# Patient Record
Sex: FEMALE | Race: WHITE | NOT HISPANIC OR LATINO
[De-identification: names, ages, dates, MRNs, and addresses within clinical notes are randomized per-mention and may not be internally consistent; named-entity substitution may affect disease eponyms.]

---

## 2017-01-26 ENCOUNTER — TRANSCRIPTION ENCOUNTER (OUTPATIENT)
Age: 32
End: 2017-01-26

## 2017-01-26 ENCOUNTER — INPATIENT (INPATIENT)
Facility: HOSPITAL | Age: 32
LOS: 2 days | Discharge: HOME | End: 2017-01-29
Payer: MEDICAID

## 2017-01-26 VITALS — WEIGHT: 149.91 LBS | HEIGHT: 59 IN

## 2017-01-26 DIAGNOSIS — O47.1 FALSE LABOR AT OR AFTER 37 COMPLETED WEEKS OF GESTATION: ICD-10-CM

## 2017-01-26 DIAGNOSIS — Z34.80 ENCOUNTER FOR SUPERVISION OF OTHER NORMAL PREGNANCY, UNSPECIFIED TRIMESTER: ICD-10-CM

## 2017-01-26 LAB
BASOPHILS # BLD AUTO: 0 K/UL — SIGNIFICANT CHANGE UP (ref 0–0.2)
BASOPHILS NFR BLD AUTO: 0.5 % — SIGNIFICANT CHANGE UP (ref 0–2)
BLD GP AB SCN SERPL QL: NEGATIVE — SIGNIFICANT CHANGE UP
EOSINOPHIL # BLD AUTO: 0 K/UL — SIGNIFICANT CHANGE UP (ref 0–0.5)
EOSINOPHIL NFR BLD AUTO: 0.4 % — SIGNIFICANT CHANGE UP (ref 0–6)
HCT VFR BLD CALC: 32.1 % — LOW (ref 34.5–45)
HGB BLD-MCNC: 10.7 G/DL — LOW (ref 11.5–15.5)
LYMPHOCYTES # BLD AUTO: 1.4 K/UL — SIGNIFICANT CHANGE UP (ref 1–3.3)
LYMPHOCYTES # BLD AUTO: 17.6 % — SIGNIFICANT CHANGE UP (ref 13–44)
MCHC RBC-ENTMCNC: 27.4 PG — SIGNIFICANT CHANGE UP (ref 27–34)
MCHC RBC-ENTMCNC: 33.5 GM/DL — SIGNIFICANT CHANGE UP (ref 32–36)
MCV RBC AUTO: 81.7 FL — SIGNIFICANT CHANGE UP (ref 80–100)
MONOCYTES # BLD AUTO: 0.4 K/UL — SIGNIFICANT CHANGE UP (ref 0–0.9)
MONOCYTES NFR BLD AUTO: 5.6 % — SIGNIFICANT CHANGE UP (ref 2–14)
NEUTROPHILS # BLD AUTO: 5.8 K/UL — SIGNIFICANT CHANGE UP (ref 1.8–7.4)
NEUTROPHILS NFR BLD AUTO: 76 % — SIGNIFICANT CHANGE UP (ref 43–77)
PLATELET # BLD AUTO: 132 K/UL — LOW (ref 150–400)
RBC # BLD: 3.92 M/UL — SIGNIFICANT CHANGE UP (ref 3.8–5.2)
RBC # FLD: 15 % — HIGH (ref 10.3–14.5)
RH IG SCN BLD-IMP: POSITIVE — SIGNIFICANT CHANGE UP
RH IG SCN BLD-IMP: POSITIVE — SIGNIFICANT CHANGE UP
WBC # BLD: 7.7 K/UL — SIGNIFICANT CHANGE UP (ref 3.8–10.5)
WBC # FLD AUTO: 7.7 K/UL — SIGNIFICANT CHANGE UP (ref 3.8–10.5)

## 2017-01-26 RX ORDER — OXYTOCIN 10 UNIT/ML
4 VIAL (ML) INJECTION
Qty: 30 | Refills: 0 | Status: DISCONTINUED | OUTPATIENT
Start: 2017-01-26 | End: 2017-01-27

## 2017-01-26 RX ORDER — SODIUM CHLORIDE 9 MG/ML
1000 INJECTION, SOLUTION INTRAVENOUS
Qty: 0 | Refills: 0 | Status: DISCONTINUED | OUTPATIENT
Start: 2017-01-26 | End: 2017-01-27

## 2017-01-26 RX ORDER — OXYTOCIN 10 UNIT/ML
333.33 VIAL (ML) INJECTION
Qty: 20 | Refills: 0 | Status: DISCONTINUED | OUTPATIENT
Start: 2017-01-26 | End: 2017-01-27

## 2017-01-26 RX ORDER — OXYTOCIN 10 UNIT/ML
6 VIAL (ML) INJECTION
Qty: 30 | Refills: 0 | Status: DISCONTINUED | OUTPATIENT
Start: 2017-01-26 | End: 2017-01-27

## 2017-01-26 RX ORDER — SODIUM CHLORIDE 9 MG/ML
500 INJECTION, SOLUTION INTRAVENOUS ONCE
Qty: 0 | Refills: 0 | Status: COMPLETED | OUTPATIENT
Start: 2017-01-26 | End: 2017-01-26

## 2017-01-26 RX ORDER — CITRIC ACID/SODIUM CITRATE 300-500 MG
15 SOLUTION, ORAL ORAL EVERY 4 HOURS
Qty: 0 | Refills: 0 | Status: DISCONTINUED | OUTPATIENT
Start: 2017-01-26 | End: 2017-01-27

## 2017-01-26 RX ADMIN — Medication 4 MILLIUNIT(S)/MIN: at 15:53

## 2017-01-26 RX ADMIN — SODIUM CHLORIDE 1000 MILLILITER(S): 9 INJECTION, SOLUTION INTRAVENOUS at 15:53

## 2017-01-26 RX ADMIN — SODIUM CHLORIDE 125 MILLILITER(S): 9 INJECTION, SOLUTION INTRAVENOUS at 15:53

## 2017-01-26 NOTE — DISCHARGE NOTE OB - MEDICATION SUMMARY - MEDICATIONS TO TAKE
I will START or STAY ON the medications listed below when I get home from the hospital:    acetaminophen 325 mg oral tablet  -- 3 tab(s) by mouth every 6 hours  -- Indication: For pain    ibuprofen 600 mg oral tablet  -- 1 tab(s) by mouth every 6 hours  -- Indication: For pain    oxyCODONE 5 mg oral tablet  -- 1 tab(s) by mouth every 6 hours MDD:4tad/d  -- Indication: For Sever pain    magnesium hydroxide 8% oral suspension  -- 30 milliliter(s) by mouth 2 times a day, As needed, Constipation  -- Indication: For constipation    lanolin topical ointment  -- 1 application on skin every 6 hours, As needed, Sore Nipples  -- Indication: For Sore nipples    Prenatal Multivitamins with Folic Acid 1 mg oral tablet  -- 1 tab(s) by mouth once a day  -- Indication: For vitamin    docusate sodium 100 mg oral capsule  -- 1 cap(s) by mouth 2 times a day, As needed, Stool Softening  -- Indication: For Stool softner

## 2017-01-26 NOTE — DISCHARGE NOTE OB - CARE PROVIDER_API CALL
Anaid Valentin), Obstetrics and Gynecology  [address] [place] [address]  Phone: [phone]  Fax: [phone]

## 2017-01-26 NOTE — DISCHARGE NOTE OB - CARE PLAN
Principal Discharge DX:	39 weeks gestation of pregnancy  Goal:	Safe delivery of patient  Instructions for follow-up, activity and diet:	Nothing in the vagina, no tampons, douches, baths, swimming or sex for 6-8 weeks.  Regular diet, follow up visit in 3 weeks

## 2017-01-26 NOTE — DISCHARGE NOTE OB - MATERIALS PROVIDED
Discharge Medication Information for Patients and Families Pocket Guide/Bottle Feeding Log/Guide to Postpartum Care/Back To Sleep Handout

## 2017-01-26 NOTE — DISCHARGE NOTE OB - PATIENT PORTAL LINK FT
“You can access the [facility] Patient Portal, offered by [facility], by registering with the following website: [email or web address]”

## 2017-01-26 NOTE — DISCHARGE NOTE OB - OTHER SIGNIFICANT FINDINGS
32 y/o  at 39w 6d who presented with contractions and leaking fluid since 4am. Exam in the office noted fluid and positive nitrazine and fern test. Pelvic noted cervix to be 2 cm. She was sent in for admission fpr L+D. 32 y/o  at 39w 6d who presented with contractions and leaking fluid since 4am. Exam in the office noted fluid and positive nitrazine and fern test. Pelvic noted cervix to be 2 cm. She was sent in for admission for L+D.  Pt had  of a male infant. with midline episiotomy  She has had a benign postpartum course

## 2017-01-26 NOTE — DISCHARGE NOTE OB - HOSPITAL COURSE
30y/o  at 39w 6d 30y/o  at 39w 6d admitted with SROM and contractions   Baby girl  midline episiotomy sustained and repaired.

## 2017-01-27 LAB — T PALLIDUM AB TITR SER: NEGATIVE — SIGNIFICANT CHANGE UP

## 2017-01-27 RX ORDER — DIBUCAINE 1 %
1 OINTMENT (GRAM) RECTAL EVERY 4 HOURS
Qty: 0 | Refills: 0 | Status: DISCONTINUED | OUTPATIENT
Start: 2017-01-27 | End: 2017-01-27

## 2017-01-27 RX ORDER — AER TRAVELER 0.5 G/1
1 SOLUTION RECTAL; TOPICAL EVERY 4 HOURS
Qty: 0 | Refills: 0 | Status: DISCONTINUED | OUTPATIENT
Start: 2017-01-27 | End: 2017-01-27

## 2017-01-27 RX ORDER — DIPHENHYDRAMINE HCL 50 MG
25 CAPSULE ORAL EVERY 6 HOURS
Qty: 0 | Refills: 0 | Status: DISCONTINUED | OUTPATIENT
Start: 2017-01-27 | End: 2017-01-29

## 2017-01-27 RX ORDER — HYDROCORTISONE 1 %
1 OINTMENT (GRAM) TOPICAL EVERY 4 HOURS
Qty: 0 | Refills: 0 | Status: DISCONTINUED | OUTPATIENT
Start: 2017-01-27 | End: 2017-01-27

## 2017-01-27 RX ORDER — OXYCODONE HYDROCHLORIDE 5 MG/1
5 TABLET ORAL EVERY 4 HOURS
Qty: 0 | Refills: 0 | Status: DISCONTINUED | OUTPATIENT
Start: 2017-01-27 | End: 2017-01-29

## 2017-01-27 RX ORDER — LANOLIN
1 OINTMENT (GRAM) TOPICAL EVERY 6 HOURS
Qty: 0 | Refills: 0 | Status: DISCONTINUED | OUTPATIENT
Start: 2017-01-27 | End: 2017-01-29

## 2017-01-27 RX ORDER — OXYTOCIN 10 UNIT/ML
41.67 VIAL (ML) INJECTION
Qty: 20 | Refills: 0 | Status: DISCONTINUED | OUTPATIENT
Start: 2017-01-27 | End: 2017-01-27

## 2017-01-27 RX ORDER — GLYCERIN ADULT
1 SUPPOSITORY, RECTAL RECTAL AT BEDTIME
Qty: 0 | Refills: 0 | Status: DISCONTINUED | OUTPATIENT
Start: 2017-01-27 | End: 2017-01-29

## 2017-01-27 RX ORDER — OXYTOCIN 10 UNIT/ML
41.67 VIAL (ML) INJECTION
Qty: 20 | Refills: 0 | Status: DISCONTINUED | OUTPATIENT
Start: 2017-01-27 | End: 2017-01-29

## 2017-01-27 RX ORDER — DOCUSATE SODIUM 100 MG
100 CAPSULE ORAL
Qty: 0 | Refills: 0 | Status: DISCONTINUED | OUTPATIENT
Start: 2017-01-27 | End: 2017-01-29

## 2017-01-27 RX ORDER — SODIUM CHLORIDE 9 MG/ML
3 INJECTION INTRAMUSCULAR; INTRAVENOUS; SUBCUTANEOUS EVERY 8 HOURS
Qty: 0 | Refills: 0 | Status: DISCONTINUED | OUTPATIENT
Start: 2017-01-27 | End: 2017-01-27

## 2017-01-27 RX ORDER — DIBUCAINE 1 %
1 OINTMENT (GRAM) RECTAL EVERY 4 HOURS
Qty: 0 | Refills: 0 | Status: DISCONTINUED | OUTPATIENT
Start: 2017-01-27 | End: 2017-01-29

## 2017-01-27 RX ORDER — SODIUM CHLORIDE 9 MG/ML
3 INJECTION INTRAMUSCULAR; INTRAVENOUS; SUBCUTANEOUS EVERY 8 HOURS
Qty: 0 | Refills: 0 | Status: DISCONTINUED | OUTPATIENT
Start: 2017-01-27 | End: 2017-01-29

## 2017-01-27 RX ORDER — KETOROLAC TROMETHAMINE 30 MG/ML
30 SYRINGE (ML) INJECTION ONCE
Qty: 0 | Refills: 0 | Status: DISCONTINUED | OUTPATIENT
Start: 2017-01-27 | End: 2017-01-27

## 2017-01-27 RX ORDER — HYDROCORTISONE 1 %
1 OINTMENT (GRAM) TOPICAL EVERY 4 HOURS
Qty: 0 | Refills: 0 | Status: DISCONTINUED | OUTPATIENT
Start: 2017-01-27 | End: 2017-01-29

## 2017-01-27 RX ORDER — ACETAMINOPHEN 500 MG
975 TABLET ORAL EVERY 6 HOURS
Qty: 0 | Refills: 0 | Status: DISCONTINUED | OUTPATIENT
Start: 2017-01-27 | End: 2017-01-29

## 2017-01-27 RX ORDER — SIMETHICONE 80 MG/1
80 TABLET, CHEWABLE ORAL EVERY 6 HOURS
Qty: 0 | Refills: 0 | Status: DISCONTINUED | OUTPATIENT
Start: 2017-01-27 | End: 2017-01-29

## 2017-01-27 RX ORDER — OXYCODONE HYDROCHLORIDE 5 MG/1
5 TABLET ORAL
Qty: 0 | Refills: 0 | Status: DISCONTINUED | OUTPATIENT
Start: 2017-01-27 | End: 2017-01-29

## 2017-01-27 RX ORDER — PRAMOXINE HYDROCHLORIDE 150 MG/15G
1 AEROSOL, FOAM RECTAL EVERY 4 HOURS
Qty: 0 | Refills: 0 | Status: DISCONTINUED | OUTPATIENT
Start: 2017-01-27 | End: 2017-01-29

## 2017-01-27 RX ORDER — MAGNESIUM HYDROXIDE 400 MG/1
30 TABLET, CHEWABLE ORAL
Qty: 0 | Refills: 0 | Status: DISCONTINUED | OUTPATIENT
Start: 2017-01-27 | End: 2017-01-29

## 2017-01-27 RX ORDER — PRAMOXINE HYDROCHLORIDE 150 MG/15G
1 AEROSOL, FOAM RECTAL EVERY 4 HOURS
Qty: 0 | Refills: 0 | Status: DISCONTINUED | OUTPATIENT
Start: 2017-01-27 | End: 2017-01-27

## 2017-01-27 RX ORDER — AER TRAVELER 0.5 G/1
1 SOLUTION RECTAL; TOPICAL EVERY 4 HOURS
Qty: 0 | Refills: 0 | Status: DISCONTINUED | OUTPATIENT
Start: 2017-01-27 | End: 2017-01-29

## 2017-01-27 RX ORDER — IBUPROFEN 200 MG
600 TABLET ORAL EVERY 6 HOURS
Qty: 0 | Refills: 0 | Status: DISCONTINUED | OUTPATIENT
Start: 2017-01-27 | End: 2017-01-29

## 2017-01-27 RX ORDER — TETANUS TOXOID, REDUCED DIPHTHERIA TOXOID AND ACELLULAR PERTUSSIS VACCINE, ADSORBED 5; 2.5; 8; 8; 2.5 [IU]/.5ML; [IU]/.5ML; UG/.5ML; UG/.5ML; UG/.5ML
0.5 SUSPENSION INTRAMUSCULAR ONCE
Qty: 0 | Refills: 0 | Status: COMPLETED | OUTPATIENT
Start: 2017-01-27

## 2017-01-27 RX ADMIN — Medication 975 MILLIGRAM(S): at 18:15

## 2017-01-27 RX ADMIN — Medication 30 MILLIGRAM(S): at 09:08

## 2017-01-27 RX ADMIN — OXYCODONE HYDROCHLORIDE 5 MILLIGRAM(S): 5 TABLET ORAL at 22:24

## 2017-01-27 RX ADMIN — Medication 975 MILLIGRAM(S): at 12:18

## 2017-01-27 RX ADMIN — OXYCODONE HYDROCHLORIDE 5 MILLIGRAM(S): 5 TABLET ORAL at 23:00

## 2017-01-27 RX ADMIN — Medication 600 MILLIGRAM(S): at 21:47

## 2017-01-27 RX ADMIN — Medication 1 TABLET(S): at 15:29

## 2017-01-27 RX ADMIN — Medication 600 MILLIGRAM(S): at 15:28

## 2017-01-27 RX ADMIN — Medication 600 MILLIGRAM(S): at 16:00

## 2017-01-27 RX ADMIN — Medication 600 MILLIGRAM(S): at 21:00

## 2017-01-27 RX ADMIN — Medication 975 MILLIGRAM(S): at 17:42

## 2017-01-28 LAB
HCT VFR BLD CALC: 30 % — LOW (ref 34.5–45)
HGB BLD-MCNC: 9.4 G/DL — LOW (ref 11.5–15.5)

## 2017-01-28 RX ORDER — ACETAMINOPHEN 500 MG
3 TABLET ORAL
Qty: 0 | Refills: 0 | COMMUNITY
Start: 2017-01-28

## 2017-01-28 RX ORDER — MAGNESIUM HYDROXIDE 400 MG/1
30 TABLET, CHEWABLE ORAL
Qty: 0 | Refills: 0 | COMMUNITY
Start: 2017-01-28

## 2017-01-28 RX ORDER — DOCUSATE SODIUM 100 MG
1 CAPSULE ORAL
Qty: 0 | Refills: 0 | DISCHARGE
Start: 2017-01-28

## 2017-01-28 RX ORDER — ACETAMINOPHEN 500 MG
3 TABLET ORAL
Qty: 0 | Refills: 0 | DISCHARGE
Start: 2017-01-28

## 2017-01-28 RX ORDER — IBUPROFEN 200 MG
1 TABLET ORAL
Qty: 0 | Refills: 0 | COMMUNITY
Start: 2017-01-28

## 2017-01-28 RX ORDER — OXYCODONE HYDROCHLORIDE 5 MG/1
1 TABLET ORAL
Qty: 16 | Refills: 0 | OUTPATIENT
Start: 2017-01-28 | End: 2017-02-01

## 2017-01-28 RX ORDER — LANOLIN
1 OINTMENT (GRAM) TOPICAL
Qty: 0 | Refills: 0 | COMMUNITY
Start: 2017-01-28

## 2017-01-28 RX ORDER — TETANUS TOXOID, REDUCED DIPHTHERIA TOXOID AND ACELLULAR PERTUSSIS VACCINE, ADSORBED 5; 2.5; 8; 8; 2.5 [IU]/.5ML; [IU]/.5ML; UG/.5ML; UG/.5ML; UG/.5ML
0.5 SUSPENSION INTRAMUSCULAR ONCE
Qty: 0 | Refills: 0 | Status: COMPLETED | OUTPATIENT
Start: 2017-01-28 | End: 2017-01-28

## 2017-01-28 RX ADMIN — OXYCODONE HYDROCHLORIDE 5 MILLIGRAM(S): 5 TABLET ORAL at 15:11

## 2017-01-28 RX ADMIN — Medication 600 MILLIGRAM(S): at 05:15

## 2017-01-28 RX ADMIN — Medication 100 MILLIGRAM(S): at 04:40

## 2017-01-28 RX ADMIN — OXYCODONE HYDROCHLORIDE 5 MILLIGRAM(S): 5 TABLET ORAL at 17:42

## 2017-01-28 RX ADMIN — OXYCODONE HYDROCHLORIDE 5 MILLIGRAM(S): 5 TABLET ORAL at 15:10

## 2017-01-28 RX ADMIN — Medication 600 MILLIGRAM(S): at 22:00

## 2017-01-28 RX ADMIN — OXYCODONE HYDROCHLORIDE 5 MILLIGRAM(S): 5 TABLET ORAL at 09:44

## 2017-01-28 RX ADMIN — Medication 975 MILLIGRAM(S): at 05:15

## 2017-01-28 RX ADMIN — OXYCODONE HYDROCHLORIDE 5 MILLIGRAM(S): 5 TABLET ORAL at 19:26

## 2017-01-28 RX ADMIN — Medication 1 TABLET(S): at 15:13

## 2017-01-28 RX ADMIN — OXYCODONE HYDROCHLORIDE 5 MILLIGRAM(S): 5 TABLET ORAL at 20:20

## 2017-01-28 RX ADMIN — Medication 600 MILLIGRAM(S): at 04:40

## 2017-01-28 RX ADMIN — TETANUS TOXOID, REDUCED DIPHTHERIA TOXOID AND ACELLULAR PERTUSSIS VACCINE, ADSORBED 0.5 MILLILITER(S): 5; 2.5; 8; 8; 2.5 SUSPENSION INTRAMUSCULAR at 19:39

## 2017-01-28 RX ADMIN — Medication 600 MILLIGRAM(S): at 09:49

## 2017-01-28 RX ADMIN — OXYCODONE HYDROCHLORIDE 5 MILLIGRAM(S): 5 TABLET ORAL at 23:28

## 2017-01-28 RX ADMIN — Medication 600 MILLIGRAM(S): at 18:09

## 2017-01-28 RX ADMIN — Medication 600 MILLIGRAM(S): at 09:48

## 2017-01-28 RX ADMIN — Medication 975 MILLIGRAM(S): at 04:40

## 2017-01-28 RX ADMIN — Medication 600 MILLIGRAM(S): at 15:05

## 2017-01-28 RX ADMIN — Medication 100 MILLIGRAM(S): at 09:49

## 2017-01-28 RX ADMIN — Medication 600 MILLIGRAM(S): at 21:09

## 2017-01-29 VITALS
HEART RATE: 75 BPM | RESPIRATION RATE: 18 BRPM | SYSTOLIC BLOOD PRESSURE: 118 MMHG | DIASTOLIC BLOOD PRESSURE: 82 MMHG | TEMPERATURE: 98 F

## 2017-01-29 PROCEDURE — 85018 HEMOGLOBIN: CPT

## 2017-01-29 PROCEDURE — G0463: CPT

## 2017-01-29 PROCEDURE — 59025 FETAL NON-STRESS TEST: CPT

## 2017-01-29 PROCEDURE — 86900 BLOOD TYPING SEROLOGIC ABO: CPT

## 2017-01-29 PROCEDURE — 90715 TDAP VACCINE 7 YRS/> IM: CPT

## 2017-01-29 PROCEDURE — 86850 RBC ANTIBODY SCREEN: CPT

## 2017-01-29 PROCEDURE — 86901 BLOOD TYPING SEROLOGIC RH(D): CPT

## 2017-01-29 PROCEDURE — 85027 COMPLETE CBC AUTOMATED: CPT

## 2017-01-29 PROCEDURE — 86780 TREPONEMA PALLIDUM: CPT

## 2017-01-29 PROCEDURE — 59050 FETAL MONITOR W/REPORT: CPT

## 2017-01-29 RX ADMIN — Medication 1 TABLET(S): at 11:39

## 2017-01-29 RX ADMIN — Medication 600 MILLIGRAM(S): at 15:16

## 2017-01-29 RX ADMIN — Medication 600 MILLIGRAM(S): at 03:50

## 2017-01-29 RX ADMIN — OXYCODONE HYDROCHLORIDE 5 MILLIGRAM(S): 5 TABLET ORAL at 09:20

## 2017-01-29 RX ADMIN — OXYCODONE HYDROCHLORIDE 5 MILLIGRAM(S): 5 TABLET ORAL at 00:20

## 2017-01-29 RX ADMIN — OXYCODONE HYDROCHLORIDE 5 MILLIGRAM(S): 5 TABLET ORAL at 06:01

## 2017-01-29 RX ADMIN — OXYCODONE HYDROCHLORIDE 5 MILLIGRAM(S): 5 TABLET ORAL at 03:05

## 2017-01-29 RX ADMIN — Medication 600 MILLIGRAM(S): at 08:20

## 2017-01-29 RX ADMIN — OXYCODONE HYDROCHLORIDE 5 MILLIGRAM(S): 5 TABLET ORAL at 06:50

## 2017-01-29 RX ADMIN — Medication 100 MILLIGRAM(S): at 11:39

## 2017-01-29 RX ADMIN — Medication 600 MILLIGRAM(S): at 03:04

## 2017-01-29 RX ADMIN — OXYCODONE HYDROCHLORIDE 5 MILLIGRAM(S): 5 TABLET ORAL at 03:50

## 2017-01-29 RX ADMIN — Medication 975 MILLIGRAM(S): at 12:57

## 2017-12-28 ENCOUNTER — ASOB RESULT (OUTPATIENT)
Age: 32
End: 2017-12-28

## 2017-12-28 ENCOUNTER — APPOINTMENT (OUTPATIENT)
Dept: ANTEPARTUM | Facility: CLINIC | Age: 32
End: 2017-12-28
Payer: MEDICAID

## 2017-12-28 PROCEDURE — 76801 OB US < 14 WKS SINGLE FETUS: CPT

## 2017-12-28 PROCEDURE — 76813 OB US NUCHAL MEAS 1 GEST: CPT

## 2017-12-28 PROCEDURE — 36416 COLLJ CAPILLARY BLOOD SPEC: CPT

## 2018-02-20 ENCOUNTER — APPOINTMENT (OUTPATIENT)
Dept: ANTEPARTUM | Facility: CLINIC | Age: 33
End: 2018-02-20
Payer: MEDICAID

## 2018-02-20 ENCOUNTER — ASOB RESULT (OUTPATIENT)
Age: 33
End: 2018-02-20

## 2018-02-20 PROCEDURE — 76817 TRANSVAGINAL US OBSTETRIC: CPT | Mod: 59

## 2018-02-20 PROCEDURE — 76811 OB US DETAILED SNGL FETUS: CPT

## 2018-06-03 ENCOUNTER — OUTPATIENT (OUTPATIENT)
Dept: OUTPATIENT SERVICES | Facility: HOSPITAL | Age: 33
LOS: 1 days | End: 2018-06-03
Payer: MEDICAID

## 2018-06-03 DIAGNOSIS — O26.899 OTHER SPECIFIED PREGNANCY RELATED CONDITIONS, UNSPECIFIED TRIMESTER: ICD-10-CM

## 2018-06-03 DIAGNOSIS — Z3A.00 WEEKS OF GESTATION OF PREGNANCY NOT SPECIFIED: ICD-10-CM

## 2018-06-03 LAB
APPEARANCE UR: ABNORMAL
BILIRUB UR-MCNC: NEGATIVE — SIGNIFICANT CHANGE UP
COLOR SPEC: SIGNIFICANT CHANGE UP
DIFF PNL FLD: NEGATIVE — SIGNIFICANT CHANGE UP
GLUCOSE UR QL: NEGATIVE — SIGNIFICANT CHANGE UP
KETONES UR-MCNC: NEGATIVE — SIGNIFICANT CHANGE UP
LEUKOCYTE ESTERASE UR-ACNC: ABNORMAL
NITRITE UR-MCNC: NEGATIVE — SIGNIFICANT CHANGE UP
PH UR: 7 — SIGNIFICANT CHANGE UP (ref 5–8)
PROT UR-MCNC: NEGATIVE — SIGNIFICANT CHANGE UP
SP GR SPEC: 1.01 — LOW (ref 1.01–1.02)
UROBILINOGEN FLD QL: NEGATIVE — SIGNIFICANT CHANGE UP

## 2018-06-03 PROCEDURE — 81001 URINALYSIS AUTO W/SCOPE: CPT

## 2018-06-03 PROCEDURE — 87653 STREP B DNA AMP PROBE: CPT

## 2018-06-03 PROCEDURE — 59025 FETAL NON-STRESS TEST: CPT

## 2018-06-03 PROCEDURE — 87086 URINE CULTURE/COLONY COUNT: CPT

## 2018-06-03 PROCEDURE — G0463: CPT

## 2018-06-03 RX ORDER — NIFEDIPINE 30 MG
20 TABLET, EXTENDED RELEASE 24 HR ORAL ONCE
Qty: 0 | Refills: 0 | Status: COMPLETED | OUTPATIENT
Start: 2018-06-03 | End: 2018-06-03

## 2018-06-03 RX ORDER — CEFTRIAXONE 500 MG/1
1 INJECTION, POWDER, FOR SOLUTION INTRAMUSCULAR; INTRAVENOUS EVERY 24 HOURS
Qty: 0 | Refills: 0 | Status: DISCONTINUED | OUTPATIENT
Start: 2018-06-04 | End: 2018-06-18

## 2018-06-03 RX ORDER — CEFTRIAXONE 500 MG/1
INJECTION, POWDER, FOR SOLUTION INTRAMUSCULAR; INTRAVENOUS
Qty: 0 | Refills: 0 | Status: DISCONTINUED | OUTPATIENT
Start: 2018-06-03 | End: 2018-06-18

## 2018-06-03 RX ORDER — CEFTRIAXONE 500 MG/1
INJECTION, POWDER, FOR SOLUTION INTRAMUSCULAR; INTRAVENOUS
Qty: 0 | Refills: 0 | Status: DISCONTINUED | OUTPATIENT
Start: 2018-06-03 | End: 2018-06-03

## 2018-06-03 RX ORDER — CEFTRIAXONE 500 MG/1
1 INJECTION, POWDER, FOR SOLUTION INTRAMUSCULAR; INTRAVENOUS ONCE
Qty: 0 | Refills: 0 | Status: COMPLETED | OUTPATIENT
Start: 2018-06-03 | End: 2018-06-03

## 2018-06-03 RX ORDER — SODIUM CHLORIDE 9 MG/ML
1000 INJECTION, SOLUTION INTRAVENOUS
Qty: 0 | Refills: 0 | Status: DISCONTINUED | OUTPATIENT
Start: 2018-06-03 | End: 2018-06-18

## 2018-06-03 RX ADMIN — Medication 20 MILLIGRAM(S): at 23:38

## 2018-06-03 RX ADMIN — SODIUM CHLORIDE 125 MILLILITER(S): 9 INJECTION, SOLUTION INTRAVENOUS at 21:50

## 2018-06-03 RX ADMIN — CEFTRIAXONE 100 GRAM(S): 500 INJECTION, POWDER, FOR SOLUTION INTRAMUSCULAR; INTRAVENOUS at 21:49

## 2018-06-04 LAB
CULTURE RESULTS: NO GROWTH — SIGNIFICANT CHANGE UP
GROUP B BETA STREP DNA (PCR): SIGNIFICANT CHANGE UP
GROUP B BETA STREP INTERPRETATION: SIGNIFICANT CHANGE UP
SOURCE GROUP B STREP: SIGNIFICANT CHANGE UP
SPECIMEN SOURCE: SIGNIFICANT CHANGE UP

## 2018-06-04 RX ORDER — NIFEDIPINE 30 MG
10 TABLET, EXTENDED RELEASE 24 HR ORAL
Qty: 8 | Refills: 0 | OUTPATIENT
Start: 2018-06-04 | End: 2018-06-05

## 2018-06-04 RX ORDER — CEPHALEXIN 500 MG
500 CAPSULE ORAL
Qty: 14 | Refills: 0 | OUTPATIENT
Start: 2018-06-04 | End: 2018-06-10

## 2018-07-04 ENCOUNTER — INPATIENT (INPATIENT)
Facility: HOSPITAL | Age: 33
LOS: 3 days | Discharge: HOME | End: 2018-07-08
Payer: MEDICAID

## 2018-07-04 ENCOUNTER — TRANSCRIPTION ENCOUNTER (OUTPATIENT)
Age: 33
End: 2018-07-04

## 2018-07-04 DIAGNOSIS — O26.899 OTHER SPECIFIED PREGNANCY RELATED CONDITIONS, UNSPECIFIED TRIMESTER: ICD-10-CM

## 2018-07-04 DIAGNOSIS — Z3A.00 WEEKS OF GESTATION OF PREGNANCY NOT SPECIFIED: ICD-10-CM

## 2018-07-04 DIAGNOSIS — Z34.80 ENCOUNTER FOR SUPERVISION OF OTHER NORMAL PREGNANCY, UNSPECIFIED TRIMESTER: ICD-10-CM

## 2018-07-04 LAB
BASOPHILS # BLD AUTO: 0 K/UL — SIGNIFICANT CHANGE UP (ref 0–0.2)
BASOPHILS NFR BLD AUTO: 0.3 % — SIGNIFICANT CHANGE UP (ref 0–2)
BLD GP AB SCN SERPL QL: NEGATIVE — SIGNIFICANT CHANGE UP
EOSINOPHIL # BLD AUTO: 0 K/UL — SIGNIFICANT CHANGE UP (ref 0–0.5)
EOSINOPHIL NFR BLD AUTO: 0.2 % — SIGNIFICANT CHANGE UP (ref 0–6)
HCT VFR BLD CALC: 35.6 % — SIGNIFICANT CHANGE UP (ref 34.5–45)
HGB BLD-MCNC: 12.4 G/DL — SIGNIFICANT CHANGE UP (ref 11.5–15.5)
LYMPHOCYTES # BLD AUTO: 1.4 K/UL — SIGNIFICANT CHANGE UP (ref 1–3.3)
LYMPHOCYTES # BLD AUTO: 20.1 % — SIGNIFICANT CHANGE UP (ref 13–44)
MCHC RBC-ENTMCNC: 29.9 PG — SIGNIFICANT CHANGE UP (ref 27–34)
MCHC RBC-ENTMCNC: 34.9 GM/DL — SIGNIFICANT CHANGE UP (ref 32–36)
MCV RBC AUTO: 85.9 FL — SIGNIFICANT CHANGE UP (ref 80–100)
MONOCYTES # BLD AUTO: 0.5 K/UL — SIGNIFICANT CHANGE UP (ref 0–0.9)
MONOCYTES NFR BLD AUTO: 6.5 % — SIGNIFICANT CHANGE UP (ref 2–14)
NEUTROPHILS # BLD AUTO: 5.2 K/UL — SIGNIFICANT CHANGE UP (ref 1.8–7.4)
NEUTROPHILS NFR BLD AUTO: 72.9 % — SIGNIFICANT CHANGE UP (ref 43–77)
PLATELET # BLD AUTO: 142 K/UL — LOW (ref 150–400)
RBC # BLD: 4.15 M/UL — SIGNIFICANT CHANGE UP (ref 3.8–5.2)
RBC # FLD: 19.3 % — HIGH (ref 10.3–14.5)
RH IG SCN BLD-IMP: POSITIVE — SIGNIFICANT CHANGE UP
WBC # BLD: 7.1 K/UL — SIGNIFICANT CHANGE UP (ref 3.8–10.5)
WBC # FLD AUTO: 7.1 K/UL — SIGNIFICANT CHANGE UP (ref 3.8–10.5)

## 2018-07-04 RX ORDER — SODIUM CHLORIDE 9 MG/ML
1000 INJECTION, SOLUTION INTRAVENOUS ONCE
Qty: 0 | Refills: 0 | Status: COMPLETED | OUTPATIENT
Start: 2018-07-04 | End: 2018-07-04

## 2018-07-04 RX ORDER — SODIUM CHLORIDE 9 MG/ML
1000 INJECTION, SOLUTION INTRAVENOUS
Qty: 0 | Refills: 0 | Status: DISCONTINUED | OUTPATIENT
Start: 2018-07-04 | End: 2018-07-05

## 2018-07-04 RX ORDER — OXYTOCIN 10 UNIT/ML
333.33 VIAL (ML) INJECTION
Qty: 20 | Refills: 0 | Status: DISCONTINUED | OUTPATIENT
Start: 2018-07-04 | End: 2018-07-05

## 2018-07-04 RX ORDER — CITRIC ACID/SODIUM CITRATE 300-500 MG
15 SOLUTION, ORAL ORAL EVERY 4 HOURS
Qty: 0 | Refills: 0 | Status: DISCONTINUED | OUTPATIENT
Start: 2018-07-04 | End: 2018-07-05

## 2018-07-04 RX ADMIN — SODIUM CHLORIDE 250 MILLILITER(S): 9 INJECTION, SOLUTION INTRAVENOUS at 20:00

## 2018-07-04 RX ADMIN — SODIUM CHLORIDE 2000 MILLILITER(S): 9 INJECTION, SOLUTION INTRAVENOUS at 19:15

## 2018-07-05 VITALS
OXYGEN SATURATION: 98 % | SYSTOLIC BLOOD PRESSURE: 136 MMHG | DIASTOLIC BLOOD PRESSURE: 98 MMHG | TEMPERATURE: 98 F | HEART RATE: 114 BPM | RESPIRATION RATE: 19 BRPM

## 2018-07-05 LAB — T PALLIDUM AB TITR SER: NEGATIVE — SIGNIFICANT CHANGE UP

## 2018-07-05 RX ORDER — LANOLIN
1 OINTMENT (GRAM) TOPICAL
Qty: 0 | Refills: 0 | Status: DISCONTINUED | OUTPATIENT
Start: 2018-07-05 | End: 2018-07-08

## 2018-07-05 RX ORDER — ACETAMINOPHEN 500 MG
975 TABLET ORAL EVERY 6 HOURS
Qty: 0 | Refills: 0 | Status: DISCONTINUED | OUTPATIENT
Start: 2018-07-05 | End: 2018-07-08

## 2018-07-05 RX ORDER — SODIUM CHLORIDE 9 MG/ML
1000 INJECTION, SOLUTION INTRAVENOUS
Qty: 0 | Refills: 0 | Status: DISCONTINUED | OUTPATIENT
Start: 2018-07-05 | End: 2018-07-08

## 2018-07-05 RX ORDER — KETOROLAC TROMETHAMINE 30 MG/ML
30 SYRINGE (ML) INJECTION EVERY 6 HOURS
Qty: 0 | Refills: 0 | Status: DISCONTINUED | OUTPATIENT
Start: 2018-07-05 | End: 2018-07-06

## 2018-07-05 RX ORDER — NALOXONE HYDROCHLORIDE 4 MG/.1ML
0.1 SPRAY NASAL
Qty: 0 | Refills: 0 | Status: DISCONTINUED | OUTPATIENT
Start: 2018-07-05 | End: 2018-07-06

## 2018-07-05 RX ORDER — OXYTOCIN 10 UNIT/ML
41.67 VIAL (ML) INJECTION
Qty: 20 | Refills: 0 | Status: DISCONTINUED | OUTPATIENT
Start: 2018-07-05 | End: 2018-07-08

## 2018-07-05 RX ORDER — HEPARIN SODIUM 5000 [USP'U]/ML
5000 INJECTION INTRAVENOUS; SUBCUTANEOUS EVERY 12 HOURS
Qty: 0 | Refills: 0 | Status: DISCONTINUED | OUTPATIENT
Start: 2018-07-05 | End: 2018-07-08

## 2018-07-05 RX ORDER — TETANUS TOXOID, REDUCED DIPHTHERIA TOXOID AND ACELLULAR PERTUSSIS VACCINE, ADSORBED 5; 2.5; 8; 8; 2.5 [IU]/.5ML; [IU]/.5ML; UG/.5ML; UG/.5ML; UG/.5ML
0.5 SUSPENSION INTRAMUSCULAR ONCE
Qty: 0 | Refills: 0 | Status: DISCONTINUED | OUTPATIENT
Start: 2018-07-05 | End: 2018-07-08

## 2018-07-05 RX ORDER — FENTANYL CITRATE 50 UG/ML
250 INJECTION INTRAVENOUS
Qty: 0 | Refills: 0 | Status: DISCONTINUED | OUTPATIENT
Start: 2018-07-05 | End: 2018-07-06

## 2018-07-05 RX ORDER — SIMETHICONE 80 MG/1
80 TABLET, CHEWABLE ORAL EVERY 4 HOURS
Qty: 0 | Refills: 0 | Status: DISCONTINUED | OUTPATIENT
Start: 2018-07-05 | End: 2018-07-08

## 2018-07-05 RX ORDER — SODIUM CHLORIDE 9 MG/ML
1000 INJECTION, SOLUTION INTRAVENOUS
Qty: 0 | Refills: 0 | Status: DISCONTINUED | OUTPATIENT
Start: 2018-07-05 | End: 2018-07-05

## 2018-07-05 RX ORDER — OXYCODONE HYDROCHLORIDE 5 MG/1
5 TABLET ORAL
Qty: 0 | Refills: 0 | Status: COMPLETED | OUTPATIENT
Start: 2018-07-05 | End: 2018-07-12

## 2018-07-05 RX ORDER — HEPARIN SODIUM 5000 [USP'U]/ML
5000 INJECTION INTRAVENOUS; SUBCUTANEOUS EVERY 12 HOURS
Qty: 0 | Refills: 0 | Status: DISCONTINUED | OUTPATIENT
Start: 2018-07-05 | End: 2018-07-05

## 2018-07-05 RX ORDER — OXYCODONE HYDROCHLORIDE 5 MG/1
5 TABLET ORAL EVERY 4 HOURS
Qty: 0 | Refills: 0 | Status: COMPLETED | OUTPATIENT
Start: 2018-07-05 | End: 2018-07-12

## 2018-07-05 RX ORDER — DEXAMETHASONE 0.5 MG/5ML
4 ELIXIR ORAL EVERY 6 HOURS
Qty: 0 | Refills: 0 | Status: DISCONTINUED | OUTPATIENT
Start: 2018-07-05 | End: 2018-07-06

## 2018-07-05 RX ORDER — OXYTOCIN 10 UNIT/ML
333.33 VIAL (ML) INJECTION
Qty: 20 | Refills: 0 | Status: DISCONTINUED | OUTPATIENT
Start: 2018-07-05 | End: 2018-07-08

## 2018-07-05 RX ORDER — OXYTOCIN 10 UNIT/ML
41.67 VIAL (ML) INJECTION
Qty: 20 | Refills: 0 | Status: DISCONTINUED | OUTPATIENT
Start: 2018-07-05 | End: 2018-07-05

## 2018-07-05 RX ORDER — FERROUS SULFATE 325(65) MG
325 TABLET ORAL DAILY
Qty: 0 | Refills: 0 | Status: DISCONTINUED | OUTPATIENT
Start: 2018-07-05 | End: 2018-07-06

## 2018-07-05 RX ORDER — ONDANSETRON 8 MG/1
4 TABLET, FILM COATED ORAL EVERY 6 HOURS
Qty: 0 | Refills: 0 | Status: DISCONTINUED | OUTPATIENT
Start: 2018-07-05 | End: 2018-07-06

## 2018-07-05 RX ORDER — DIPHENHYDRAMINE HCL 50 MG
25 CAPSULE ORAL EVERY 6 HOURS
Qty: 0 | Refills: 0 | Status: DISCONTINUED | OUTPATIENT
Start: 2018-07-05 | End: 2018-07-08

## 2018-07-05 RX ORDER — IBUPROFEN 200 MG
600 TABLET ORAL EVERY 6 HOURS
Qty: 0 | Refills: 0 | Status: COMPLETED | OUTPATIENT
Start: 2018-07-05 | End: 2019-06-03

## 2018-07-05 RX ORDER — GLYCERIN ADULT
1 SUPPOSITORY, RECTAL RECTAL AT BEDTIME
Qty: 0 | Refills: 0 | Status: DISCONTINUED | OUTPATIENT
Start: 2018-07-05 | End: 2018-07-08

## 2018-07-05 RX ORDER — DOCUSATE SODIUM 100 MG
100 CAPSULE ORAL
Qty: 0 | Refills: 0 | Status: DISCONTINUED | OUTPATIENT
Start: 2018-07-05 | End: 2018-07-06

## 2018-07-05 RX ADMIN — HEPARIN SODIUM 5000 UNIT(S): 5000 INJECTION INTRAVENOUS; SUBCUTANEOUS at 17:28

## 2018-07-05 RX ADMIN — FENTANYL CITRATE 250 MILLILITER(S): 50 INJECTION INTRAVENOUS at 04:17

## 2018-07-05 RX ADMIN — Medication 30 MILLIGRAM(S): at 17:27

## 2018-07-05 RX ADMIN — Medication 30 MILLIGRAM(S): at 11:29

## 2018-07-05 RX ADMIN — Medication 325 MILLIGRAM(S): at 11:29

## 2018-07-05 RX ADMIN — FENTANYL CITRATE 250 MILLILITER(S): 50 INJECTION INTRAVENOUS at 17:33

## 2018-07-05 RX ADMIN — Medication 1 TABLET(S): at 11:29

## 2018-07-05 RX ADMIN — Medication 30 MILLIGRAM(S): at 05:15

## 2018-07-06 LAB
BASOPHILS # BLD AUTO: 0 K/UL — SIGNIFICANT CHANGE UP (ref 0–0.2)
BASOPHILS NFR BLD AUTO: 0.2 % — SIGNIFICANT CHANGE UP (ref 0–2)
EOSINOPHIL # BLD AUTO: 0 K/UL — SIGNIFICANT CHANGE UP (ref 0–0.5)
EOSINOPHIL NFR BLD AUTO: 0.1 % — SIGNIFICANT CHANGE UP (ref 0–6)
HCT VFR BLD CALC: 26.5 % — LOW (ref 34.5–45)
HGB BLD-MCNC: 8.9 G/DL — LOW (ref 11.5–15.5)
LYMPHOCYTES # BLD AUTO: 1.9 K/UL — SIGNIFICANT CHANGE UP (ref 1–3.3)
LYMPHOCYTES # BLD AUTO: 25 % — SIGNIFICANT CHANGE UP (ref 13–44)
MCHC RBC-ENTMCNC: 30.1 PG — SIGNIFICANT CHANGE UP (ref 27–34)
MCHC RBC-ENTMCNC: 33.7 GM/DL — SIGNIFICANT CHANGE UP (ref 32–36)
MCV RBC AUTO: 89.3 FL — SIGNIFICANT CHANGE UP (ref 80–100)
MONOCYTES # BLD AUTO: 0.4 K/UL — SIGNIFICANT CHANGE UP (ref 0–0.9)
MONOCYTES NFR BLD AUTO: 5.2 % — SIGNIFICANT CHANGE UP (ref 2–14)
NEUTROPHILS # BLD AUTO: 5.4 K/UL — SIGNIFICANT CHANGE UP (ref 1.8–7.4)
NEUTROPHILS NFR BLD AUTO: 69.5 % — SIGNIFICANT CHANGE UP (ref 43–77)
PLATELET # BLD AUTO: 108 K/UL — LOW (ref 150–400)
RBC # BLD: 2.97 M/UL — LOW (ref 3.8–5.2)
RBC # FLD: 19.8 % — HIGH (ref 10.3–14.5)
WBC # BLD: 7.7 K/UL — SIGNIFICANT CHANGE UP (ref 3.8–10.5)
WBC # FLD AUTO: 7.7 K/UL — SIGNIFICANT CHANGE UP (ref 3.8–10.5)

## 2018-07-06 RX ORDER — ASCORBIC ACID 60 MG
250 TABLET,CHEWABLE ORAL THREE TIMES A DAY
Qty: 0 | Refills: 0 | Status: DISCONTINUED | OUTPATIENT
Start: 2018-07-06 | End: 2018-07-08

## 2018-07-06 RX ORDER — OXYCODONE HYDROCHLORIDE 5 MG/1
5 TABLET ORAL
Qty: 0 | Refills: 0 | Status: DISCONTINUED | OUTPATIENT
Start: 2018-07-06 | End: 2018-07-08

## 2018-07-06 RX ORDER — DOCUSATE SODIUM 100 MG
100 CAPSULE ORAL THREE TIMES A DAY
Qty: 0 | Refills: 0 | Status: DISCONTINUED | OUTPATIENT
Start: 2018-07-06 | End: 2018-07-08

## 2018-07-06 RX ORDER — OXYCODONE HYDROCHLORIDE 5 MG/1
5 TABLET ORAL EVERY 4 HOURS
Qty: 0 | Refills: 0 | Status: DISCONTINUED | OUTPATIENT
Start: 2018-07-06 | End: 2018-07-08

## 2018-07-06 RX ORDER — FERROUS SULFATE 325(65) MG
325 TABLET ORAL THREE TIMES A DAY
Qty: 0 | Refills: 0 | Status: DISCONTINUED | OUTPATIENT
Start: 2018-07-06 | End: 2018-07-08

## 2018-07-06 RX ORDER — IBUPROFEN 200 MG
600 TABLET ORAL EVERY 6 HOURS
Qty: 0 | Refills: 0 | Status: DISCONTINUED | OUTPATIENT
Start: 2018-07-06 | End: 2018-07-08

## 2018-07-06 RX ADMIN — Medication 30 MILLIGRAM(S): at 07:00

## 2018-07-06 RX ADMIN — OXYCODONE HYDROCHLORIDE 5 MILLIGRAM(S): 5 TABLET ORAL at 15:55

## 2018-07-06 RX ADMIN — OXYCODONE HYDROCHLORIDE 5 MILLIGRAM(S): 5 TABLET ORAL at 10:33

## 2018-07-06 RX ADMIN — Medication 30 MILLIGRAM(S): at 01:20

## 2018-07-06 RX ADMIN — Medication 325 MILLIGRAM(S): at 10:34

## 2018-07-06 RX ADMIN — Medication 975 MILLIGRAM(S): at 22:19

## 2018-07-06 RX ADMIN — Medication 1 TABLET(S): at 10:34

## 2018-07-06 RX ADMIN — Medication 975 MILLIGRAM(S): at 23:00

## 2018-07-06 RX ADMIN — Medication 975 MILLIGRAM(S): at 11:15

## 2018-07-06 RX ADMIN — Medication 30 MILLIGRAM(S): at 11:50

## 2018-07-06 RX ADMIN — Medication 25 MILLIGRAM(S): at 00:54

## 2018-07-06 RX ADMIN — Medication 30 MILLIGRAM(S): at 00:48

## 2018-07-06 RX ADMIN — Medication 250 MILLIGRAM(S): at 23:47

## 2018-07-06 RX ADMIN — OXYCODONE HYDROCHLORIDE 5 MILLIGRAM(S): 5 TABLET ORAL at 22:00

## 2018-07-06 RX ADMIN — Medication 30 MILLIGRAM(S): at 12:20

## 2018-07-06 RX ADMIN — Medication 100 MILLIGRAM(S): at 22:19

## 2018-07-06 RX ADMIN — OXYCODONE HYDROCHLORIDE 5 MILLIGRAM(S): 5 TABLET ORAL at 21:15

## 2018-07-06 RX ADMIN — Medication 975 MILLIGRAM(S): at 10:30

## 2018-07-06 RX ADMIN — HEPARIN SODIUM 5000 UNIT(S): 5000 INJECTION INTRAVENOUS; SUBCUTANEOUS at 05:11

## 2018-07-06 RX ADMIN — Medication 30 MILLIGRAM(S): at 23:52

## 2018-07-06 RX ADMIN — OXYCODONE HYDROCHLORIDE 5 MILLIGRAM(S): 5 TABLET ORAL at 16:40

## 2018-07-06 RX ADMIN — OXYCODONE HYDROCHLORIDE 5 MILLIGRAM(S): 5 TABLET ORAL at 23:52

## 2018-07-06 RX ADMIN — Medication 30 MILLIGRAM(S): at 18:30

## 2018-07-06 RX ADMIN — Medication 325 MILLIGRAM(S): at 22:18

## 2018-07-06 RX ADMIN — Medication 30 MILLIGRAM(S): at 06:04

## 2018-07-06 RX ADMIN — Medication 30 MILLIGRAM(S): at 17:43

## 2018-07-06 RX ADMIN — OXYCODONE HYDROCHLORIDE 5 MILLIGRAM(S): 5 TABLET ORAL at 11:15

## 2018-07-06 RX ADMIN — HEPARIN SODIUM 5000 UNIT(S): 5000 INJECTION INTRAVENOUS; SUBCUTANEOUS at 17:43

## 2018-07-06 RX ADMIN — FENTANYL CITRATE 250 MILLILITER(S): 50 INJECTION INTRAVENOUS at 09:27

## 2018-07-07 ENCOUNTER — TRANSCRIPTION ENCOUNTER (OUTPATIENT)
Age: 33
End: 2018-07-07

## 2018-07-07 LAB
APPEARANCE UR: CLEAR — SIGNIFICANT CHANGE UP
BACTERIA # UR AUTO: ABNORMAL /HPF
BILIRUB UR-MCNC: NEGATIVE — SIGNIFICANT CHANGE UP
COLOR SPEC: SIGNIFICANT CHANGE UP
DIFF PNL FLD: ABNORMAL
EPI CELLS # UR: SIGNIFICANT CHANGE UP /HPF
GLUCOSE UR QL: NEGATIVE — SIGNIFICANT CHANGE UP
KETONES UR-MCNC: NEGATIVE — SIGNIFICANT CHANGE UP
LEUKOCYTE ESTERASE UR-ACNC: ABNORMAL
NITRITE UR-MCNC: NEGATIVE — SIGNIFICANT CHANGE UP
PH UR: 8 — SIGNIFICANT CHANGE UP (ref 5–8)
PROT UR-MCNC: NEGATIVE — SIGNIFICANT CHANGE UP
RBC CASTS # UR COMP ASSIST: ABNORMAL /HPF (ref 0–2)
SP GR SPEC: 1 — LOW (ref 1.01–1.02)
UROBILINOGEN FLD QL: NEGATIVE — SIGNIFICANT CHANGE UP
WBC UR QL: SIGNIFICANT CHANGE UP /HPF (ref 0–5)

## 2018-07-07 RX ORDER — KETOROLAC TROMETHAMINE 30 MG/ML
30 SYRINGE (ML) INJECTION ONCE
Qty: 0 | Refills: 0 | Status: DISCONTINUED | OUTPATIENT
Start: 2018-07-07 | End: 2018-07-07

## 2018-07-07 RX ORDER — FERROUS SULFATE 325(65) MG
1 TABLET ORAL
Qty: 90 | Refills: 0
Start: 2018-07-07 | End: 2018-08-05

## 2018-07-07 RX ORDER — IBUPROFEN 200 MG
1 TABLET ORAL
Qty: 60 | Refills: 1
Start: 2018-07-07 | End: 2018-08-05

## 2018-07-07 RX ORDER — ASCORBIC ACID 60 MG
1 TABLET,CHEWABLE ORAL
Qty: 0 | Refills: 0 | DISCHARGE
Start: 2018-07-07

## 2018-07-07 RX ORDER — OXYCODONE HYDROCHLORIDE 5 MG/1
1 TABLET ORAL
Qty: 16 | Refills: 0
Start: 2018-07-07 | End: 2018-07-10

## 2018-07-07 RX ADMIN — HEPARIN SODIUM 5000 UNIT(S): 5000 INJECTION INTRAVENOUS; SUBCUTANEOUS at 18:07

## 2018-07-07 RX ADMIN — Medication 600 MILLIGRAM(S): at 12:14

## 2018-07-07 RX ADMIN — Medication 975 MILLIGRAM(S): at 16:03

## 2018-07-07 RX ADMIN — Medication 100 MILLIGRAM(S): at 05:55

## 2018-07-07 RX ADMIN — OXYCODONE HYDROCHLORIDE 5 MILLIGRAM(S): 5 TABLET ORAL at 21:32

## 2018-07-07 RX ADMIN — OXYCODONE HYDROCHLORIDE 5 MILLIGRAM(S): 5 TABLET ORAL at 22:15

## 2018-07-07 RX ADMIN — Medication 250 MILLIGRAM(S): at 21:33

## 2018-07-07 RX ADMIN — HEPARIN SODIUM 5000 UNIT(S): 5000 INJECTION INTRAVENOUS; SUBCUTANEOUS at 05:56

## 2018-07-07 RX ADMIN — SIMETHICONE 80 MILLIGRAM(S): 80 TABLET, CHEWABLE ORAL at 08:42

## 2018-07-07 RX ADMIN — OXYCODONE HYDROCHLORIDE 5 MILLIGRAM(S): 5 TABLET ORAL at 03:53

## 2018-07-07 RX ADMIN — Medication 600 MILLIGRAM(S): at 18:41

## 2018-07-07 RX ADMIN — Medication 325 MILLIGRAM(S): at 21:32

## 2018-07-07 RX ADMIN — OXYCODONE HYDROCHLORIDE 5 MILLIGRAM(S): 5 TABLET ORAL at 18:06

## 2018-07-07 RX ADMIN — OXYCODONE HYDROCHLORIDE 5 MILLIGRAM(S): 5 TABLET ORAL at 14:43

## 2018-07-07 RX ADMIN — Medication 975 MILLIGRAM(S): at 10:24

## 2018-07-07 RX ADMIN — Medication 250 MILLIGRAM(S): at 06:05

## 2018-07-07 RX ADMIN — OXYCODONE HYDROCHLORIDE 5 MILLIGRAM(S): 5 TABLET ORAL at 15:20

## 2018-07-07 RX ADMIN — OXYCODONE HYDROCHLORIDE 5 MILLIGRAM(S): 5 TABLET ORAL at 11:10

## 2018-07-07 RX ADMIN — Medication 975 MILLIGRAM(S): at 22:15

## 2018-07-07 RX ADMIN — Medication 250 MILLIGRAM(S): at 13:05

## 2018-07-07 RX ADMIN — Medication 975 MILLIGRAM(S): at 04:30

## 2018-07-07 RX ADMIN — Medication 30 MILLIGRAM(S): at 05:55

## 2018-07-07 RX ADMIN — Medication 100 MILLIGRAM(S): at 12:14

## 2018-07-07 RX ADMIN — Medication 975 MILLIGRAM(S): at 03:53

## 2018-07-07 RX ADMIN — Medication 975 MILLIGRAM(S): at 16:40

## 2018-07-07 RX ADMIN — Medication 325 MILLIGRAM(S): at 05:55

## 2018-07-07 RX ADMIN — OXYCODONE HYDROCHLORIDE 5 MILLIGRAM(S): 5 TABLET ORAL at 18:41

## 2018-07-07 RX ADMIN — Medication 100 MILLIGRAM(S): at 21:32

## 2018-07-07 RX ADMIN — Medication 600 MILLIGRAM(S): at 13:00

## 2018-07-07 RX ADMIN — Medication 975 MILLIGRAM(S): at 21:32

## 2018-07-07 RX ADMIN — Medication 1 TABLET(S): at 10:24

## 2018-07-07 RX ADMIN — OXYCODONE HYDROCHLORIDE 5 MILLIGRAM(S): 5 TABLET ORAL at 00:30

## 2018-07-07 RX ADMIN — Medication 30 MILLIGRAM(S): at 00:30

## 2018-07-07 RX ADMIN — OXYCODONE HYDROCHLORIDE 5 MILLIGRAM(S): 5 TABLET ORAL at 10:27

## 2018-07-07 RX ADMIN — Medication 600 MILLIGRAM(S): at 18:07

## 2018-07-07 RX ADMIN — Medication 975 MILLIGRAM(S): at 11:00

## 2018-07-07 RX ADMIN — Medication 325 MILLIGRAM(S): at 12:14

## 2018-07-07 NOTE — DISCHARGE NOTE OB - MATERIALS PROVIDED
[facility] Hearing Screen Program/Vaccinations/Guide to Postpartum Care/Shaken Baby Prevention Handout/Birth Certificate Instructions/Bottle Feeding Log/Back To Sleep Handout/[facility] [place] Screening Program/  Immunization Record/Discharge Medication Information for Patients and Families Pocket Guide

## 2018-07-07 NOTE — DISCHARGE NOTE OB - PLAN OF CARE
Safe delivery of patient Nothing in the vagina, no tampons, douches, baths, swimming or sex for 6-8 weeks.  Regular diet, follow up visit in 1 week.

## 2018-07-07 NOTE — DISCHARGE NOTE OB - MEDICATION SUMMARY - MEDICATIONS TO STOP TAKING
I will STOP taking the medications listed below when I get home from the hospital:    Procardia 10 mg oral capsule  -- 10 cap(s) by mouth every 6 hours   -- Avoid grapefruit and grapefruit juice while taking this medication.  It is very important that you take or use this exactly as directed.  Do not skip doses or discontinue unless directed by your doctor.  Some non-prescription drugs may aggravate your condition.  Read all labels carefully.  If a warning appears, check with your doctor before taking.    Keflex 500 mg oral capsule  -- 500 milligram(s) by mouth 2 times a day   -- Finish all this medication unless otherwise directed by prescriber.

## 2018-07-07 NOTE — DISCHARGE NOTE OB - CARE PROVIDER_API CALL
Anaid Valentin), Obstetrics and Gynecology  [address] [place] [address]  Phone: [phone]  Fax: [phone]

## 2018-07-07 NOTE — DISCHARGE NOTE OB - SECONDARY DIAGNOSIS.
Arrest of descent, delivered, current hospitalization Arrest of dilation, delivered, current hospitalization

## 2018-07-07 NOTE — DISCHARGE NOTE OB - HOSPITAL COURSE
33 y/o W/F  at 39+ weeks who presented in labor. Pt progressed in labor to 9 cm and arrested in dilation and descent of the vertex. She had a Primary C/S and was delivered of a viable Baby boy; apgars 9/9 and weight of 8' 8".  She has had a benign postop and postpartum course and is discharged home.

## 2018-07-07 NOTE — DISCHARGE NOTE OB - CARE PLAN
Principal Discharge DX:	39 weeks gestation of pregnancy  Goal:	Safe delivery of patient  Assessment and plan of treatment:	Nothing in the vagina, no tampons, douches, baths, swimming or sex for 6-8 weeks.  Regular diet, follow up visit in 1 week.  Secondary Diagnosis:	Arrest of dilation, delivered, current hospitalization  Secondary Diagnosis:	Arrest of descent, delivered, current hospitalization

## 2018-07-07 NOTE — DISCHARGE NOTE OB - PATIENT PORTAL LINK FT
You can access the [facility] Patient Portal, offered by [facility], by registering with the following website: [email or web address]

## 2018-07-07 NOTE — DISCHARGE NOTE OB - MEDICATION SUMMARY - MEDICATIONS TO TAKE
I will START or STAY ON the medications listed below when I get home from the hospital:    acetaminophen 325 mg oral tablet  -- 3 tab(s) by mouth every 6 hours, As Needed, no more than 4000mg/24 hours  -- Indication: For Pain    ibuprofen 600 mg oral tablet  -- 1 tab(s) by mouth every 6 hours  -- Indication: For Pain    oxyCODONE 5 mg oral tablet  -- 1 tab(s) by mouth every 6 hours MDD:4tabs/24 hours  -- Indication: For Sever pain    Prenatal Multivitamins with Folic Acid 1 mg oral tablet  -- 1 tab(s) by mouth once a day  -- Indication: For vitamin    ferrous sulfate 325 mg (65 mg elemental iron) oral tablet  -- 1 tab(s) by mouth 3 times a day  -- Indication: For iron    docusate sodium 100 mg oral capsule  -- 1 cap(s) by mouth 2 times a day, As needed, Stool Softening  -- Indication: For Stool softner    ascorbic acid 250 mg oral tablet  -- 1 tab(s) by mouth 3 times a day  -- Indication: For viamin C

## 2018-07-08 VITALS
DIASTOLIC BLOOD PRESSURE: 85 MMHG | TEMPERATURE: 98 F | HEART RATE: 62 BPM | SYSTOLIC BLOOD PRESSURE: 125 MMHG | RESPIRATION RATE: 18 BRPM

## 2018-07-08 DIAGNOSIS — D50.0 IRON DEFICIENCY ANEMIA SECONDARY TO BLOOD LOSS (CHRONIC): ICD-10-CM

## 2018-07-08 LAB
HCT VFR BLD CALC: 28.6 % — LOW (ref 34.5–45)
HGB BLD-MCNC: 9.6 G/DL — LOW (ref 11.5–15.5)
MCHC RBC-ENTMCNC: 29.7 PG — SIGNIFICANT CHANGE UP (ref 27–34)
MCHC RBC-ENTMCNC: 33.4 GM/DL — SIGNIFICANT CHANGE UP (ref 32–36)
MCV RBC AUTO: 89.1 FL — SIGNIFICANT CHANGE UP (ref 80–100)
PLATELET # BLD AUTO: 143 K/UL — LOW (ref 150–400)
RBC # BLD: 3.21 M/UL — LOW (ref 3.8–5.2)
RBC # FLD: 19.6 % — HIGH (ref 10.3–14.5)
WBC # BLD: 5.5 K/UL — SIGNIFICANT CHANGE UP (ref 3.8–10.5)
WBC # FLD AUTO: 5.5 K/UL — SIGNIFICANT CHANGE UP (ref 3.8–10.5)

## 2018-07-08 PROCEDURE — 81001 URINALYSIS AUTO W/SCOPE: CPT

## 2018-07-08 PROCEDURE — 86780 TREPONEMA PALLIDUM: CPT

## 2018-07-08 PROCEDURE — 59025 FETAL NON-STRESS TEST: CPT

## 2018-07-08 PROCEDURE — 59050 FETAL MONITOR W/REPORT: CPT

## 2018-07-08 PROCEDURE — 86901 BLOOD TYPING SEROLOGIC RH(D): CPT

## 2018-07-08 PROCEDURE — C1765: CPT

## 2018-07-08 PROCEDURE — 86850 RBC ANTIBODY SCREEN: CPT

## 2018-07-08 PROCEDURE — G0463: CPT

## 2018-07-08 PROCEDURE — 86900 BLOOD TYPING SEROLOGIC ABO: CPT

## 2018-07-08 PROCEDURE — 85027 COMPLETE CBC AUTOMATED: CPT

## 2018-07-08 RX ADMIN — Medication 600 MILLIGRAM(S): at 00:50

## 2018-07-08 RX ADMIN — Medication 325 MILLIGRAM(S): at 05:45

## 2018-07-08 RX ADMIN — Medication 975 MILLIGRAM(S): at 03:16

## 2018-07-08 RX ADMIN — Medication 975 MILLIGRAM(S): at 04:12

## 2018-07-08 RX ADMIN — Medication 600 MILLIGRAM(S): at 00:07

## 2018-07-08 RX ADMIN — Medication 250 MILLIGRAM(S): at 05:47

## 2018-07-08 RX ADMIN — OXYCODONE HYDROCHLORIDE 5 MILLIGRAM(S): 5 TABLET ORAL at 00:50

## 2018-07-08 RX ADMIN — OXYCODONE HYDROCHLORIDE 5 MILLIGRAM(S): 5 TABLET ORAL at 04:13

## 2018-07-08 RX ADMIN — Medication 975 MILLIGRAM(S): at 11:00

## 2018-07-08 RX ADMIN — Medication 100 MILLIGRAM(S): at 05:45

## 2018-07-08 RX ADMIN — OXYCODONE HYDROCHLORIDE 5 MILLIGRAM(S): 5 TABLET ORAL at 05:46

## 2018-07-08 RX ADMIN — Medication 600 MILLIGRAM(S): at 12:30

## 2018-07-08 RX ADMIN — Medication 600 MILLIGRAM(S): at 06:30

## 2018-07-08 RX ADMIN — Medication 1 TABLET(S): at 11:59

## 2018-07-08 RX ADMIN — Medication 600 MILLIGRAM(S): at 11:59

## 2018-07-08 RX ADMIN — OXYCODONE HYDROCHLORIDE 5 MILLIGRAM(S): 5 TABLET ORAL at 00:07

## 2018-07-08 RX ADMIN — SIMETHICONE 80 MILLIGRAM(S): 80 TABLET, CHEWABLE ORAL at 05:49

## 2018-07-08 RX ADMIN — Medication 600 MILLIGRAM(S): at 05:45

## 2018-07-08 RX ADMIN — OXYCODONE HYDROCHLORIDE 5 MILLIGRAM(S): 5 TABLET ORAL at 03:16

## 2018-07-08 RX ADMIN — HEPARIN SODIUM 5000 UNIT(S): 5000 INJECTION INTRAVENOUS; SUBCUTANEOUS at 05:45

## 2018-07-08 RX ADMIN — OXYCODONE HYDROCHLORIDE 5 MILLIGRAM(S): 5 TABLET ORAL at 06:30

## 2018-07-08 RX ADMIN — Medication 975 MILLIGRAM(S): at 10:31

## 2018-07-08 RX ADMIN — OXYCODONE HYDROCHLORIDE 5 MILLIGRAM(S): 5 TABLET ORAL at 10:58

## 2018-07-08 RX ADMIN — OXYCODONE HYDROCHLORIDE 5 MILLIGRAM(S): 5 TABLET ORAL at 10:31

## 2018-07-08 NOTE — PROGRESS NOTE ADULT - PROBLEM SELECTOR PLAN 1
Increase OOB  Renew IVF  Renew PCEA  DVT ppx  Dressing removed  Regular diet  AM CBC  Routine Postpartum/Post-op care
Increase OOB  D/C IVF  D/C PCEA  PO Pain Protocol  Continue Regular Diet  Continue routine prenatal care
Increase OOB  Regular diet  PO Pain Protocol  Continue heparin for DVT ppx.  Routine Postop/Postpartum care  Discharge Planning
ROUTINE INSTRUCTIONS GIVEN f/u THIS WEEK
routin
routine care

## 2018-07-08 NOTE — PROGRESS NOTE ADULT - SUBJECTIVE AND OBJECTIVE BOX
Section/Postpartum  ZULEYKA MONTGOMERY is a  32y woman  , who is now post-operative day 3.  PAST MEDICAL & SURGICAL HISTORY:    Subjective:  The patient feels well.  She is ambulating without difficulty.  She is tolerating PO.  She is voiding.  She denies nausea and vomiting.  Her pain is controlled.  She reports normal postpartum bleeding  She is breastfeeding.  She is formula feeding.    Physical exam:    Vital Signs Last 24 Hrs  T(C): 36.4 (2018 05:00), Max: 36.9 (2018 12:50)  T(F): 97.6 (2018 05:00), Max: 98.4 (2018 12:50)  HR: 62 (2018 05:00) (62 - 90)  BP: 125/85 (2018 05:00) (119/79 - 133/88)  BP(mean): --  RR: 18 (2018 05:00) (17 - 18)  SpO2: 98% (2018 21:29) (97% - 98%)    General Apperance: alert and oriented in no acute distress  Breast: Soft, nontender, non engorged  Abdomen: Soft, nontender, not distended,  Uterine fundus is firm and at umbilicus, BS(+), Flatus(+), Bowel Movement (+)  Incision: Clean, dry, and intact   Pelvic: Normal lochia noted  Ext: No calf tenderness, No edema or cyanosis  Lochia: not excessive    LABS:                        9.6    5.5   )-----------( 143      ( 2018 05:47 )             28.6     Blood Type:   Type + Screen (18 @ 19:31)    ABO Interpretation: O    Rh Interpretation: Positive    Antibody Screen: Negative    Rubella status:  Immune    Allergies    No Known Allergies    Intolerances        MEDICATIONS  (STANDING):  acetaminophen   Tablet. 975 milliGRAM(s) Oral every 6 hours  ascorbic acid 250 milliGRAM(s) Oral three times a day  diphtheria/tetanus/pertussis (acellular) Vaccine (ADAcel) 0.5 milliLiter(s) IntraMuscular once  docusate sodium 100 milliGRAM(s) Oral three times a day  ferrous    sulfate 325 milliGRAM(s) Oral three times a day  heparin  Injectable 5000 Unit(s) SubCutaneous every 12 hours  ibuprofen  Tablet 600 milliGRAM(s) Oral every 6 hours  lactated ringers. 1000 milliLiter(s) (125 mL/Hr) IV Continuous <Continuous>  oxyCODONE    IR 5 milliGRAM(s) Oral every 3 hours  oxytocin Infusion 41.667 milliUNIT(s)/Min (125 mL/Hr) IV Continuous <Continuous>  oxytocin Infusion 333.333 milliUNIT(s)/Min (1000 mL/Hr) IV Continuous <Continuous>  prenatal multivitamin 1 Tablet(s) Oral daily    MEDICATIONS  (PRN):  diphenhydrAMINE   Capsule 25 milliGRAM(s) Oral every 6 hours PRN Itching  glycerin Suppository - Adult 1 Suppository(s) Rectal at bedtime PRN Constipation  lanolin Ointment 1 Application(s) Topical every 3 hours PRN Sore Nipples  oxyCODONE    IR 5 milliGRAM(s) Oral every 4 hours PRN Severe Pain (7 - 10)  simethicone 80 milliGRAM(s) Chew every 4 hours PRN Gas        Assessment and Plan  POD # 3  S/P PCS       Doing well.  Encourage ambulation.  Discharge home today.  Paient to take ibuprofen and/or Tylenol for pain  Follow up in 1-2 week(s) for incision check, ZULEYKA MONTGOMERY is to call the office for an appointment.  To use abdominal binder while awake as needed  Verbal discharge instructions d/w patient  - discharge summary to be given to her on discharge for the hospital.  Call for fevers, chills, nausea, vomiting, heavy vaginal bleeding, vaginal discharge, severe pain, symptoms of depression, problems with incision or any other concerning symptoms.  Nothing in vagina and no heavy lifting for  6-8 weeks. No sex!  No driving x 2 weeks, Do not drive if you are taking narcotics.  Patient to continue with prenatal vitamins.
 Section/Postpartum  ZULEYKA MONTGOMERY is a  32y woman , who is now post-operative day: 2  PAST MEDICAL & SURGICAL HISTORY:    Subjective:  The patient feels well.  She is ambulating.   She is tolerating regular diet.  She denies nausea and vomiting.  She is voiding.  Her pain is controlled.  She reports normal postpartum bleeding.  She is breastfeeding.  She is formula feeding.    Physical exam:    Vital Signs Last 24 Hrs  T(C): 36.9 (2018 12:50), Max: 36.9 (2018 12:50)  T(F): 98.4 (2018 12:50), Max: 98.4 (2018 12:50)  HR: 89 (2018 12:50) (70 - 92)  BP: 120/79 (2018 12:50) (120/79 - 135/86)  BP(mean): --  RR: 18 (2018 12:50) (18 - 18)  SpO2: --    General: alert and oriented in no acute distress.  Breast: Soft, nontender, not engorged.  Abdomen: Soft, nontender, not distended ,  Uterine fundus is firm and below the umbilicus, BS (+), Flatus (+), Bowel Movement (-)  Incision: Clean, dry, and intact, bandage has been removed  Pelvic: Normal lochia noted  Ext: No calf tenderness  Lochia: not excessive    LABS:                        8.9    7.7   )-----------( 108      ( 2018 06:02 )             26.5       Rubella status:  immune    Blood Type:  Type + Screen (18 @ 19:31)    ABO Interpretation: O    Rh Interpretation: Positive    Antibody Screen: Negative                        Allergies    No Known Allergies    Intolerances      MEDICATIONS  (STANDING):  acetaminophen   Tablet. 975 milliGRAM(s) Oral every 6 hours  ascorbic acid 250 milliGRAM(s) Oral three times a day  diphtheria/tetanus/pertussis (acellular) Vaccine (ADAcel) 0.5 milliLiter(s) IntraMuscular once  docusate sodium 100 milliGRAM(s) Oral three times a day  ferrous    sulfate 325 milliGRAM(s) Oral three times a day  heparin  Injectable 5000 Unit(s) SubCutaneous every 12 hours  ibuprofen  Tablet 600 milliGRAM(s) Oral every 6 hours  lactated ringers. 1000 milliLiter(s) (125 mL/Hr) IV Continuous <Continuous>  oxyCODONE    IR 5 milliGRAM(s) Oral every 3 hours  oxytocin Infusion 41.667 milliUNIT(s)/Min (125 mL/Hr) IV Continuous <Continuous>  oxytocin Infusion 333.333 milliUNIT(s)/Min (1000 mL/Hr) IV Continuous <Continuous>  prenatal multivitamin 1 Tablet(s) Oral daily    MEDICATIONS  (PRN):  diphenhydrAMINE   Capsule 25 milliGRAM(s) Oral every 6 hours PRN Itching  glycerin Suppository - Adult 1 Suppository(s) Rectal at bedtime PRN Constipation  lanolin Ointment 1 Application(s) Topical every 3 hours PRN Sore Nipples  oxyCODONE    IR 5 milliGRAM(s) Oral every 4 hours PRN Severe Pain (7 - 10)  simethicone 80 milliGRAM(s) Chew every 4 hours PRN Gas        Assessment and Plan  POD #  2  s/p   PCS    Doing well.  Encourage ambulation, may shower, routine postop care  Circumcision today.
 Section/Postpartum  ZULEYKA MONTGOMERY is a  32y woman G 2 ,  who is now post-operative day: 1  PAST MEDICAL & SURGICAL HISTORY:    Subjective:  The patient feels well.  She is ambulating.   She is tolerating regular diet.  She denies nausea and vomiting.  She is voiding.  Her pain is controlled.  She reports normal postpartum bleeding.  She is breastfeeding.  She is formula feeding.    Physical exam:    Vital Signs Last 24 Hrs  T(C): 36.8 (2018 05:00), Max: 37 (2018 11:30)  T(F): 98.3 (2018 05:00), Max: 98.6 (2018 11:30)  HR: 88 (2018 05:00) (71 - 88)  BP: 117/78 (2018 05:00) (117/78 - 135/78)  BP(mean): --  RR: 18 (2018 05:00) (18 - 18)  SpO2: 96% (2018 13:46) (96% - 96%)    General: alert and oriented in no acute distress.  Breast: Soft, nontender, not engorged.  Abdomen: Soft, nontender, not distended ,  Uterine fundus is firm and below the umbilicus, BS (+), Flatus (+), Bowel Movement (-)  Incision: Clean, dry, and intact, bandage has been removed  Pelvic: Normal lochia noted  Ext: No calf tenderness  Lochia: not excessive      LABS:                        8.9    7.7   )-----------( 108      ( 2018 06:02 )             26.5       Rubella status:  Immune    Blood Type:   Type + Screen (18 @ 19:31)    ABO Interpretation: O    Rh Interpretation: Positive    Antibody Screen: Negative                       Allergies    No Known Allergies    Intolerances      MEDICATIONS  (STANDING):  acetaminophen   Tablet. 975 milliGRAM(s) Oral every 6 hours  diphtheria/tetanus/pertussis (acellular) Vaccine (ADAcel) 0.5 milliLiter(s) IntraMuscular once  fentaNYL (3 MICROgram(s)/mL) + BUpivacaine 0.01% in 0.9% Sodium Chloride PCEA 250 milliLiter(s) Epidural PCA Continuous  ferrous    sulfate 325 milliGRAM(s) Oral daily  heparin  Injectable 5000 Unit(s) SubCutaneous every 12 hours  ibuprofen  Tablet 600 milliGRAM(s) Oral every 6 hours  ketorolac   Injectable 30 milliGRAM(s) IV Push every 6 hours  lactated ringers. 1000 milliLiter(s) (125 mL/Hr) IV Continuous <Continuous>  oxyCODONE    IR 5 milliGRAM(s) Oral every 3 hours  oxytocin Infusion 333.333 milliUNIT(s)/Min (1000 mL/Hr) IV Continuous <Continuous>  oxytocin Infusion 41.667 milliUNIT(s)/Min (125 mL/Hr) IV Continuous <Continuous>  prenatal multivitamin 1 Tablet(s) Oral daily    MEDICATIONS  (PRN):  dexamethasone  Injectable 4 milliGRAM(s) IV Push every 6 hours PRN Nausea, IF ondansetron is ineffective after 30 - 60 minutes  diphenhydrAMINE   Capsule 25 milliGRAM(s) Oral every 6 hours PRN Itching  docusate sodium 100 milliGRAM(s) Oral two times a day PRN Stool Softening  fentaNYL (3 MICROgram(s)/mL) + BUpivacaine 0.01% in 0.9% Sodium Chloride PCEA Rescue Clinician Bolus 5 milliLiter(s) Epidural every 15 minutes PRN Moderate Pain (4 - 6)  glycerin Suppository - Adult 1 Suppository(s) Rectal at bedtime PRN Constipation  lanolin Ointment 1 Application(s) Topical every 3 hours PRN Sore Nipples  naloxone Injectable 0.1 milliGRAM(s) IV Push every 3 minutes PRN For ANY of the following changes in patient status:  A. RR LESS THAN 10 breaths per minute, B. Oxygen saturation LESS THAN 90%, C. Sedation score of 6  ondansetron Injectable 4 milliGRAM(s) IV Push every 6 hours PRN Nausea  oxyCODONE    IR 5 milliGRAM(s) Oral every 4 hours PRN Severe Pain (7 - 10)  simethicone 80 milliGRAM(s) Chew every 4 hours PRN Gas        Assessment and Plan  POD # 1   s/p  PCS    Doing well.  Encourage ambulation, may shower, routine postop care.
Day 0 of Anesthesia Pain Management Service    SUBJECTIVE: I'm okay  Pain Scale Score:    [X] Refer to charted pain scores    THERAPY: Epidural Bupivacaine 0.01 % and Fentanyl 3 micrograms/mL     Demand Dose: 3 mL  Lockout: 15 minutes   Continuous Rate:  10 mL    MEDICATIONS  (STANDING):  acetaminophen   Tablet. 975 milliGRAM(s) Oral every 6 hours  diphtheria/tetanus/pertussis (acellular) Vaccine (ADAcel) 0.5 milliLiter(s) IntraMuscular once  fentaNYL (3 MICROgram(s)/mL) + BUpivacaine 0.01% in 0.9% Sodium Chloride PCEA 250 milliLiter(s) Epidural PCA Continuous  ferrous    sulfate 325 milliGRAM(s) Oral daily  ibuprofen  Tablet 600 milliGRAM(s) Oral every 6 hours  ketorolac   Injectable 30 milliGRAM(s) IV Push every 6 hours  lactated ringers. 1000 milliLiter(s) (125 mL/Hr) IV Continuous <Continuous>  oxyCODONE    IR 5 milliGRAM(s) Oral every 3 hours  oxytocin Infusion 333.333 milliUNIT(s)/Min (1000 mL/Hr) IV Continuous <Continuous>  oxytocin Infusion 41.667 milliUNIT(s)/Min (125 mL/Hr) IV Continuous <Continuous>  prenatal multivitamin 1 Tablet(s) Oral daily    MEDICATIONS  (PRN):  dexamethasone  Injectable 4 milliGRAM(s) IV Push every 6 hours PRN Nausea, IF ondansetron is ineffective after 30 - 60 minutes  diphenhydrAMINE   Capsule 25 milliGRAM(s) Oral every 6 hours PRN Itching  docusate sodium 100 milliGRAM(s) Oral two times a day PRN Stool Softening  fentaNYL (3 MICROgram(s)/mL) + BUpivacaine 0.01% in 0.9% Sodium Chloride PCEA Rescue Clinician Bolus 5 milliLiter(s) Epidural every 15 minutes PRN Moderate Pain (4 - 6)  glycerin Suppository - Adult 1 Suppository(s) Rectal at bedtime PRN Constipation  lanolin Ointment 1 Application(s) Topical every 3 hours PRN Sore Nipples  naloxone Injectable 0.1 milliGRAM(s) IV Push every 3 minutes PRN For ANY of the following changes in patient status:  A. RR LESS THAN 10 breaths per minute, B. Oxygen saturation LESS THAN 90%, C. Sedation score of 6  ondansetron Injectable 4 milliGRAM(s) IV Push every 6 hours PRN Nausea  oxyCODONE    IR 5 milliGRAM(s) Oral every 4 hours PRN Severe Pain (7 - 10)  simethicone 80 milliGRAM(s) Chew every 4 hours PRN Gas      OBJECTIVE:    Assessment of Epidural Catheter Site: 	    [X] Dressing intact	[X] Site non-tender	[X] Site without erythema, discharge, edema  [X] Epidural tubing and connection checked	[X] Gross neurological exam within normal limits  [ ] Catheter removed – tip intact		                          12.4   7.1   )-----------( 142      ( 04 Jul 2018 19:28 )             35.6     Vital Signs Last 24 Hrs  T(C): 36.8 (07-05-18 @ 06:28), Max: 36.9 (07-05-18 @ 04:00)  T(F): 98.2 (07-05-18 @ 06:28), Max: 98.4 (07-05-18 @ 04:00)  HR: 75 (07-05-18 @ 06:28) (75 - 114)  BP: 121/75 (07-05-18 @ 06:28) (116/61 - 136/98)  BP(mean): 87 (07-05-18 @ 05:30) (82 - 112)  RR: 18 (07-05-18 @ 06:28) (13 - 24)  SpO2: 94% (07-05-18 @ 05:30) (94% - 98%)      Sedation Score:	[X] Alert	[ ] Drowsy	[ ] Arousable  [ ] Asleep  [ ] Unresponsive    Side Effects:	[X] None	[ ] Nausea	[ ] Vomiting  [ ] Pruritus  		[ ] Weakness  [ ] Numbness  [ ] Other:    ASSESSMENT/ PLAN:    Therapy:                         [X] Continue   [ ] Discontinue   [ ] Change to PRN Analgesics    Documentation and Verification of current medications:  [X] Done	[ ] Not done, not eligible, reason:    Comments:
Day 1 of Anesthesia Pain Management Service    SUBJECTIVE: I'm okay  Pain Scale Score:    [X] Refer to charted pain scores    THERAPY: Epidural Bupivacaine 0.01 % and Fentanyl 3 micrograms/mL     Demand Dose: 3 mL  Lockout: 15 minutes   Continuous Rate:  10 mL    MEDICATIONS  (STANDING):  acetaminophen   Tablet. 975 milliGRAM(s) Oral every 6 hours  diphtheria/tetanus/pertussis (acellular) Vaccine (ADAcel) 0.5 milliLiter(s) IntraMuscular once  fentaNYL (3 MICROgram(s)/mL) + BUpivacaine 0.01% in 0.9% Sodium Chloride PCEA 250 milliLiter(s) Epidural PCA Continuous  ferrous    sulfate 325 milliGRAM(s) Oral daily  heparin  Injectable 5000 Unit(s) SubCutaneous every 12 hours  ibuprofen  Tablet 600 milliGRAM(s) Oral every 6 hours  ketorolac   Injectable 30 milliGRAM(s) IV Push every 6 hours  lactated ringers. 1000 milliLiter(s) (125 mL/Hr) IV Continuous <Continuous>  oxyCODONE    IR 5 milliGRAM(s) Oral every 3 hours  oxytocin Infusion 333.333 milliUNIT(s)/Min (1000 mL/Hr) IV Continuous <Continuous>  oxytocin Infusion 41.667 milliUNIT(s)/Min (125 mL/Hr) IV Continuous <Continuous>  prenatal multivitamin 1 Tablet(s) Oral daily    MEDICATIONS  (PRN):  dexamethasone  Injectable 4 milliGRAM(s) IV Push every 6 hours PRN Nausea, IF ondansetron is ineffective after 30 - 60 minutes  diphenhydrAMINE   Capsule 25 milliGRAM(s) Oral every 6 hours PRN Itching  docusate sodium 100 milliGRAM(s) Oral two times a day PRN Stool Softening  fentaNYL (3 MICROgram(s)/mL) + BUpivacaine 0.01% in 0.9% Sodium Chloride PCEA Rescue Clinician Bolus 5 milliLiter(s) Epidural every 15 minutes PRN Moderate Pain (4 - 6)  glycerin Suppository - Adult 1 Suppository(s) Rectal at bedtime PRN Constipation  lanolin Ointment 1 Application(s) Topical every 3 hours PRN Sore Nipples  naloxone Injectable 0.1 milliGRAM(s) IV Push every 3 minutes PRN For ANY of the following changes in patient status:  A. RR LESS THAN 10 breaths per minute, B. Oxygen saturation LESS THAN 90%, C. Sedation score of 6  ondansetron Injectable 4 milliGRAM(s) IV Push every 6 hours PRN Nausea  oxyCODONE    IR 5 milliGRAM(s) Oral every 4 hours PRN Severe Pain (7 - 10)  simethicone 80 milliGRAM(s) Chew every 4 hours PRN Gas      OBJECTIVE:    Assessment of Epidural Catheter Site: 	    [X] Dressing intact	[X] Site non-tender	[X] Site without erythema, discharge, edema  [X] Epidural tubing and connection checked	[X] Gross neurological exam within normal limits  [ ] Catheter removed – tip intact		                          8.9    7.7   )-----------( 108      ( 06 Jul 2018 06:02 )             26.5     Vital Signs Last 24 Hrs  T(C): 36.8 (07-06-18 @ 05:00), Max: 37 (07-05-18 @ 11:30)  T(F): 98.3 (07-06-18 @ 05:00), Max: 98.6 (07-05-18 @ 11:30)  HR: 88 (07-06-18 @ 05:00) (71 - 88)  BP: 117/78 (07-06-18 @ 05:00) (117/78 - 135/78)  BP(mean): --  RR: 18 (07-06-18 @ 05:00) (18 - 18)  SpO2: 96% (07-05-18 @ 13:46) (96% - 96%)      Sedation Score:	[X] Alert	[ ] Drowsy	[ ] Arousable  [ ] Asleep  [ ] Unresponsive    Side Effects:	[X] None	[ ] Nausea	[ ] Vomiting  [ ] Pruritus  		[ ] Weakness  [ ] Numbness  [ ] Other:    ASSESSMENT/ PLAN:    Therapy:                         [X] Continue   [ ] Discontinue   [ ] Change to PRN Analgesics    Documentation and Verification of current medications:  [X] Done	[ ] Not done, not eligible, reason:    Comments:
Pain Management Attending Addendum    SUBJECTIVE:    Therapy:	  [ ] IV PCA	   [X ] Epidural           [ ] s/p Spinal Opoid              [ ] Postpartum infusion	  [ ] Patient controlled regional anesthesia (PCRA)    [ ] prn Analgesics    OBJECTIVE:   [X ] No new signs     [ ] Other:    Side Effects:  [ X] None			[ ] Other:    Assessment of Catheter Site:		[ X] Intact		[ ] Other:    ASSESSMENT/PLAN  [ X] Continue current therapy    [ ] Therapy changed to:    [ ] IV PCA       [ ] Epidural     [ ] prn Analgesics     [ ] post partum infusion    Comments:
Pain Management Attending Addendum    SUBJECTIVE: Patient doing well with PCEA    Therapy:    [X] PCEA    OBJECTIVE:   [X] Pain appropriately controlled    [ ] Other:    Side Effects:  [X] None	             [ ] Nausea              [ ] Pruritis        [ ] Weakness          [ ] Numbness        	[ ] Other:    ASSESSMENT/PLAN:    [X] Continue current therapy    [ ] Therapy changed to:    [ ] PRN Analgesics   [ ] IV PCA    Comments:I discussed the case with the resident/NP and agree with their plan.
Postpartum Note-  Section POD#2    Allergies: No Known Allergies    Subjective: Patient w/o complaints, pain is controlled.  Pt is OOB, tolerating PO, passing flatus, and voiding. Lochia WNL.     O:  Vital Signs Last 24 Hrs  T(C): 36.4 (2018 06:02), Max: 37.1 (2018 09:58)  T(F): 97.6 (2018 06:02), Max: 98.7 (2018 09:58)  HR: 70 (2018 06:02) (70 - 103)  BP: 135/86 (2018 06:02) (106/71 - 135/86)  BP(mean): --  RR: 18 (2018 06:02) (18 - 18)  SpO2: 94% (2018 09:58) (94% - 94%)      Gen: NAD  Abdomen: Soft, nontender, non distended, fundus firm.  Incision: Clean, dry, and intact.  Negative erythema/edema/ecchymosis.   SubQ  Lochia WNL  Ext: Neg edema, Neg calf tenderness    LABS:               8.9    7.7   )-----------( 108      (  @ 06:02 )             26.5                12.4   7.1   )-----------( 142      ( 04 @ 19:28 )             35.6
Postpartum Note-  Section POD#3    Prenatal Labs  Blood type: O Positive  Rubella IgG:  Immune  RPR: Negative      S: Patient c/o indigestion after taking pain medication without eating first overnight. She reports her pain is controlled.  Pt is OOB, ambulating, tolerating PO, passing flatus, voiding. Vaginal bleeding minimal. Pt is breast feeding.  Denies dizziness, CP, SOB, n/v, abdominal pain or calf pain.     pmhx: , MAB w D&C and TOP     O:  Vital Signs Last 24 Hrs  T(C): 36.4 (2018 05:00), Max: 36.9 (2018 12:50)  T(F): 97.6 (2018 05:00), Max: 98.4 (2018 12:50)  HR: 62 (2018 05:00) (62 - 90)  BP: 125/85 (2018 05:00) (119/79 - 133/88)  RR: 18 (2018 05:00) (17 - 18)  SpO2: 98% (2018 21:29) (97% - 98%)     Gen: NAD  Abdomen: Soft, nontender, non-distended, fundus firm.  Incision: subQ w/ steri's - Clean/dry/ intact.    -erythema   -ecchymosis     Lochia: WNL  Ext:  Neg Edema, Neg Calf tenderness b/l.    LABS:               9.6    5.5   )-----------( 143      (  @ 05:47 )             28.6                8.9    7.7   )-----------( 108      (  @ 06:02 )             26.5
Postpartum Note-  Section POD#1    Prenatal Labs  Blood type: O Positive  Rubella IgG: Imm  RPR: Negative          S:Patient w/o complaints, pain is controlled.  Pt is OOB, tolerating PO, passing flatus. +Void. Lochia WNL.     O:  Vital Signs Last 24 Hrs  T(C): 36.8 (2018 05:00), Max: 37 (2018 11:30)  T(F): 98.3 (2018 05:00), Max: 98.6 (2018 11:30)  HR: 88 (2018 05:00) (71 - 88)  BP: 117/78 (2018 05:00) (117/78 - 135/78)  BP(mean): --  RR: 18 (2018 05:00) (18 - 18)  SpO2: 96% (2018 13:46) (96% - 96%)  I&O's Summary    2018 07:01  -  2018 07:00  --------------------------------------------------------  IN: 0 mL / OUT: 700 mL / NET: -700 mL        Gen: NAD  Abdomen: Soft, appropriate tenderness, non-distended, fundus firm.  Incision: Clean/dry/ intact. no erythema, no ecchymosis   Sub Q     Lochia WNL  Ext:Nontender    LABS:             8.9    7.7   )-----------( 108      (  @ 06:02 )             26.5                12.4   7.1   )-----------( 142      (  @ 19:28 )             35.6

## 2018-07-08 NOTE — PROGRESS NOTE ADULT - PROBLEM SELECTOR PROBLEM 1
delivery delivered
 delivery delivered
Postpartum care following  delivery
 delivery delivered

## 2018-07-08 NOTE — PROGRESS NOTE ADULT - ASSESSMENT
A/P:  32y  POD # 2 S/P    section, doing well    PMHx: none  Current Issues: acute blood loss anemia
A/P:  32y  POD # 3 S/P primary   section for arrest of dilation and cat II tracing c/b anemia.
staBLE
A/P:  32y  POD # 1 S/P primary   section for Cat2/Cat3 FHT  Doing well    PMHx:PAST MEDICAL & SURGICAL HISTORY:    Current Issues:

## 2020-01-06 NOTE — PATIENT PROFILE OB - VISION (WITH CORRECTIVE LENSES IF THE PATIENT USUALLY WEARS THEM):
Certification for Inpatient


Patient admitted to: Inpatient


With expected LOS: >2 Midnights


Patient will require the following post-hospital care: None


Practitioner: I am a practitioner with admitting privileges, knowledge of 

patient current condition, hospital course, and medical plan of care.


Services: Services provided to patient in accordance with Admission 

requirements found in Title 42 Section 412.3 of the Code of Federal Regulations





Patient History


Date of Service: 01/06/20


Primary Care Provider: PCP


Reason for admission: Failed Outpatient Pneumonia


History of Present Illness: 





This is a 36-year-old female that came into the emergency room after failing 

outpatient therapy for pneumonia.  Primary care physician had put patient on 

Cipro and Augmentin.  Patient stated that she is still running fevers and 

having difficulty breathing with increased congestion and cough.  Patient was 

worked up in the emergency room at that time.  Chest x-ray shows worsening of 

pneumonia.  Patient had increased white cell count but no increased lactate or 

procalcitonin.  Vital signs remained stable.  Pain under control at this time.  

Patient was started on new antibiotics in the emergency room and was admitted 

at that time.  Patient has a history of bronchiectasis.  It is currently 

undergoing treatment from pulmonologist in [place].  Patient stated that she 

had notified pulmonologist that she was going to be admitted here as well.


Allergies





No Known Allergies Allergy (Verified 12/12/17 22:58)


 





Home medications list reviewed: Yes


Home Medications: 








Albuterol Sulfate [Proventil Hfa] 2 puff PO Q6H PRN 12/12/17 


Sodium Chloride 3% Inhalation [Hypertonic Saline (3%) Neb*] 1 amp PO TID PRN 12/ 12/17 








- Past Medical/Surgical History


Has patient received pneumonia vaccine in the past: Yes


Diabetic: No


-: bronchiectasis


-: pneumonia


-: Multiple bronchoscopies Pseudomonas and Haemophilus isolated- 2017





- Family History


  ** Mother


-: Hypertension





- Social History


Smoking Status: Never smoker


Smoking therapy provided: No


Alcohol use: Yes


CD- Drugs: No


Caffeine use: Yes


Place of Residence: Home





Review of Systems


General: Fever, Weakness, Malaise


Eyes: Unremarkable


ENT: Unremarkable


Respiratory: Cough, Shortness of Breath, SOB with Excertion, Sputum


Cardiovascular: Chest Pain


Gastrointestinal: Unremarkable


Genitourinary: Unremarkable


Musculoskeletal: Unremarkable


Integumentary: Unremarkable


Neurological: Unremarkable





Physical Examination





- Vital Signs


Temperature: 99.9 F


Blood Pressure: 92/51


Pulse: 97


Respirations: 20


Pulse Ox (%): 95





- Physical Exam


General: Alert, In no apparent distress, Oriented x3


HEENT: Normocephalic, PERRLA, Mucous membr. moist/pink, EOMI


Neck: Supple, 2+ carotid pulse no bruit, JVD not distended, No Thyromegaly, No 

LAD


Respiratory: Expiratory wheezes


Cardiovascular: No edema, Normal pulses, No gallops, No rubs, No murmurs, Other 

(Sinus tachycardia)


Capillary refill: <2 Seconds


Gastrointestinal: Normal bowel sounds, Soft and benign, Non-distended, No 

ascites, No tenderness, No masses, No rebound, No guarding


Musculoskeletal: No clubbing, No swelling, No contractures, No erythema, No 

tenderness, No warmth


Integumentary: No rashes, No breakdown, No significant lesion, No tenderness/

swelling, No erythema, No warmth, No cyanosis


Neurological: Normal gait, Normal speech, Normal strength at 5/5 x4 extr, 

Normal tone, Sensation intact, Cranial nerves 3-12 intact, Normal reflexes 2+, 

Normal affect


Lymphatics: No axilla or inguinal lymphadenopathy





- Studies


Laboratory Data (last 24 hrs)





01/06/20 07:00: Sodium 135 L, Potassium 3.9, BUN 18, Creatinine 0.69, Glucose 96

, Total Bilirubin 0.4, AST 7 L, ALT 28, Alkaline Phosphatase 68


01/06/20 07:00: WBC 13.6 H D, Hgb 13.4, Hct 40.5, Plt Count 316  D





Microbiology Data (last 24 hrs): 








01/06/20 07:10   Nasopharnyx   Influenza Type A Antigen Screen - Final


01/06/20 07:10   Nasopharnyx   Influenza Type B Antigen Screen - Final








Assessment and Plan





- Problems (Diagnosis)


(1) Pneumonia


Current Visit: Yes   Status: Acute   


Qualifiers: 


   Pneumonia type: due to unspecified organism   Laterality: left   Lung 

location: lower lobe of lung   Qualified Code(s): J18.9 - Pneumonia, 

unspecified organism   





(2) Bronchiectasis


Onset Date: 12/13/17   Current Visit: No   Status: Chronic   


Qualifiers: 


 





- Plan





Patient will be admitted for pneumonia.  Fluids will be given intravenously.  

Antibiotics will continue upstairs.  Patient will be monitored closely.  

Patient will remain on nasal cannula.  Breathing treatments as needed.  Cardiac 

monitoring will be completed as well.  Labs will be checked daily.  Patient is 

young and can ambulate as needed.  Respiratory has also been consulted for 

b.i.d. aerosolized normal saline nebulizations.  Pulmonology has been consulted 

since patient has failed outpatient pneumonia and secondary pulmonary process.  

We will see how patient does over next 48 hr.  If patient continues to improve 

hopefully we can discharge her in the next 2-3 days.


Discharge Plan: Home


Plan to discharge in: Greater than 2 days





- Advance Directives


Does patient have a Living Will: No


Does patient have a Durable POA for Healthcare: No





- Code Status/Comfort Care


Code Status Assessed: Yes


Code Status: Full Code


Critical Care: No


Time Spent Managing Pts Care (In Minutes): 45 Normal vision: sees adequately in most situations; can see medication labels, newsprint

## 2021-02-11 ENCOUNTER — APPOINTMENT (OUTPATIENT)
Dept: PEDIATRIC MEDICAL GENETICS | Facility: CLINIC | Age: 36
End: 2021-02-11
Payer: COMMERCIAL

## 2021-02-11 DIAGNOSIS — O28.9 UNSPECIFIED ABNORMAL FINDINGS ON ANTENATAL SCREENING OF MOTHER: ICD-10-CM

## 2021-02-11 PROCEDURE — 99203 OFFICE O/P NEW LOW 30 MIN: CPT | Mod: 95

## 2021-02-11 PROCEDURE — ZZZZZ: CPT

## 2021-02-15 PROBLEM — O28.9 ABNORMAL FINDING ON ANTENATAL SCREEN: Status: ACTIVE | Noted: 2021-02-15

## 2021-06-15 ENCOUNTER — OUTPATIENT (OUTPATIENT)
Dept: OUTPATIENT SERVICES | Facility: HOSPITAL | Age: 36
LOS: 1 days | End: 2021-06-15
Payer: COMMERCIAL

## 2021-06-15 ENCOUNTER — OUTPATIENT (OUTPATIENT)
Dept: OUTPATIENT SERVICES | Facility: HOSPITAL | Age: 36
LOS: 1 days | End: 2021-06-15
Payer: SELF-PAY

## 2021-06-15 VITALS
SYSTOLIC BLOOD PRESSURE: 120 MMHG | HEART RATE: 66 BPM | RESPIRATION RATE: 16 BRPM | DIASTOLIC BLOOD PRESSURE: 74 MMHG | HEIGHT: 59 IN | OXYGEN SATURATION: 98 % | WEIGHT: 141.98 LBS | TEMPERATURE: 98 F

## 2021-06-15 DIAGNOSIS — Z01.818 ENCOUNTER FOR OTHER PREPROCEDURAL EXAMINATION: ICD-10-CM

## 2021-06-15 DIAGNOSIS — O34.211 MATERNAL CARE FOR LOW TRANSVERSE SCAR FROM PREVIOUS CESAREAN DELIVERY: ICD-10-CM

## 2021-06-15 DIAGNOSIS — Z98.891 HISTORY OF UTERINE SCAR FROM PREVIOUS SURGERY: Chronic | ICD-10-CM

## 2021-06-15 DIAGNOSIS — Z11.52 ENCOUNTER FOR SCREENING FOR COVID-19: ICD-10-CM

## 2021-06-15 LAB
BLD GP AB SCN SERPL QL: NEGATIVE — SIGNIFICANT CHANGE UP
HCT VFR BLD CALC: 32.3 % — LOW (ref 34.5–45)
HGB BLD-MCNC: 9.9 G/DL — LOW (ref 11.5–15.5)
MCHC RBC-ENTMCNC: 25.3 PG — LOW (ref 27–34)
MCHC RBC-ENTMCNC: 30.7 GM/DL — LOW (ref 32–36)
MCV RBC AUTO: 82.4 FL — SIGNIFICANT CHANGE UP (ref 80–100)
NRBC # BLD: 0 /100 WBCS — SIGNIFICANT CHANGE UP (ref 0–0)
PLATELET # BLD AUTO: 131 K/UL — LOW (ref 150–400)
RBC # BLD: 3.92 M/UL — SIGNIFICANT CHANGE UP (ref 3.8–5.2)
RBC # FLD: 15.9 % — HIGH (ref 10.3–14.5)
RH IG SCN BLD-IMP: POSITIVE — SIGNIFICANT CHANGE UP
SARS-COV-2 RNA SPEC QL NAA+PROBE: SIGNIFICANT CHANGE UP
WBC # BLD: 5.19 K/UL — SIGNIFICANT CHANGE UP (ref 3.8–10.5)
WBC # FLD AUTO: 5.19 K/UL — SIGNIFICANT CHANGE UP (ref 3.8–10.5)

## 2021-06-15 PROCEDURE — G0463: CPT

## 2021-06-15 PROCEDURE — U0005: CPT

## 2021-06-15 PROCEDURE — 86900 BLOOD TYPING SEROLOGIC ABO: CPT

## 2021-06-15 PROCEDURE — C9803: CPT

## 2021-06-15 PROCEDURE — 86850 RBC ANTIBODY SCREEN: CPT

## 2021-06-15 PROCEDURE — 86901 BLOOD TYPING SEROLOGIC RH(D): CPT

## 2021-06-15 PROCEDURE — U0003: CPT

## 2021-06-15 PROCEDURE — 85027 COMPLETE CBC AUTOMATED: CPT

## 2021-06-15 RX ORDER — CHLORHEXIDINE GLUCONATE 213 G/1000ML
1 SOLUTION TOPICAL ONCE
Refills: 0 | Status: DISCONTINUED | OUTPATIENT
Start: 2021-06-18 | End: 2021-06-20

## 2021-06-15 RX ORDER — OXYTOCIN 10 UNIT/ML
333.33 VIAL (ML) INJECTION
Qty: 20 | Refills: 0 | Status: DISCONTINUED | OUTPATIENT
Start: 2021-06-18 | End: 2021-06-20

## 2021-06-15 RX ORDER — SODIUM CHLORIDE 9 MG/ML
1000 INJECTION, SOLUTION INTRAVENOUS ONCE
Refills: 0 | Status: DISCONTINUED | OUTPATIENT
Start: 2021-06-18 | End: 2021-06-18

## 2021-06-15 RX ORDER — FAMOTIDINE 10 MG/ML
20 INJECTION INTRAVENOUS ONCE
Refills: 0 | Status: COMPLETED | OUTPATIENT
Start: 2021-06-18 | End: 2021-06-18

## 2021-06-15 RX ORDER — CEFAZOLIN SODIUM 1 G
2000 VIAL (EA) INJECTION ONCE
Refills: 0 | Status: DISCONTINUED | OUTPATIENT
Start: 2021-06-18 | End: 2021-06-20

## 2021-06-15 RX ORDER — SODIUM CHLORIDE 9 MG/ML
1000 INJECTION, SOLUTION INTRAVENOUS
Refills: 0 | Status: DISCONTINUED | OUTPATIENT
Start: 2021-06-18 | End: 2021-06-18

## 2021-06-15 RX ORDER — CITRIC ACID/SODIUM CITRATE 300-500 MG
15 SOLUTION, ORAL ORAL ONCE
Refills: 0 | Status: COMPLETED | OUTPATIENT
Start: 2021-06-18 | End: 2021-06-18

## 2021-06-17 ENCOUNTER — TRANSCRIPTION ENCOUNTER (OUTPATIENT)
Age: 36
End: 2021-06-17

## 2021-06-18 ENCOUNTER — INPATIENT (INPATIENT)
Facility: HOSPITAL | Age: 36
LOS: 1 days | Discharge: HOME | End: 2021-06-20
Payer: COMMERCIAL

## 2021-06-18 VITALS — HEART RATE: 69 BPM | TEMPERATURE: 99 F | DIASTOLIC BLOOD PRESSURE: 67 MMHG | SYSTOLIC BLOOD PRESSURE: 130 MMHG

## 2021-06-18 DIAGNOSIS — Z98.891 HISTORY OF UTERINE SCAR FROM PREVIOUS SURGERY: Chronic | ICD-10-CM

## 2021-06-18 DIAGNOSIS — O34.211 MATERNAL CARE FOR LOW TRANSVERSE SCAR FROM PREVIOUS CESAREAN DELIVERY: ICD-10-CM

## 2021-06-18 LAB
BLD GP AB SCN SERPL QL: NEGATIVE — SIGNIFICANT CHANGE UP
RH IG SCN BLD-IMP: POSITIVE — SIGNIFICANT CHANGE UP

## 2021-06-18 RX ORDER — DIPHENHYDRAMINE HCL 50 MG
25 CAPSULE ORAL EVERY 6 HOURS
Refills: 0 | Status: DISCONTINUED | OUTPATIENT
Start: 2021-06-18 | End: 2021-06-20

## 2021-06-18 RX ORDER — SIMETHICONE 80 MG/1
80 TABLET, CHEWABLE ORAL EVERY 4 HOURS
Refills: 0 | Status: DISCONTINUED | OUTPATIENT
Start: 2021-06-18 | End: 2021-06-20

## 2021-06-18 RX ORDER — HEPARIN SODIUM 5000 [USP'U]/ML
5000 INJECTION INTRAVENOUS; SUBCUTANEOUS EVERY 12 HOURS
Refills: 0 | Status: DISCONTINUED | OUTPATIENT
Start: 2021-06-19 | End: 2021-06-20

## 2021-06-18 RX ORDER — MAGNESIUM HYDROXIDE 400 MG/1
30 TABLET, CHEWABLE ORAL
Refills: 0 | Status: DISCONTINUED | OUTPATIENT
Start: 2021-06-18 | End: 2021-06-20

## 2021-06-18 RX ORDER — OXYCODONE HYDROCHLORIDE 5 MG/1
5 TABLET ORAL
Refills: 0 | Status: DISCONTINUED | OUTPATIENT
Start: 2021-06-18 | End: 2021-06-19

## 2021-06-18 RX ORDER — TETANUS TOXOID, REDUCED DIPHTHERIA TOXOID AND ACELLULAR PERTUSSIS VACCINE, ADSORBED 5; 2.5; 8; 8; 2.5 [IU]/.5ML; [IU]/.5ML; UG/.5ML; UG/.5ML; UG/.5ML
0.5 SUSPENSION INTRAMUSCULAR ONCE
Refills: 0 | Status: DISCONTINUED | OUTPATIENT
Start: 2021-06-18 | End: 2021-06-20

## 2021-06-18 RX ORDER — DIPHENHYDRAMINE HCL 50 MG
25 CAPSULE ORAL EVERY 6 HOURS
Refills: 0 | Status: COMPLETED | OUTPATIENT
Start: 2021-06-18 | End: 2022-05-17

## 2021-06-18 RX ORDER — LANOLIN
1 OINTMENT (GRAM) TOPICAL EVERY 6 HOURS
Refills: 0 | Status: DISCONTINUED | OUTPATIENT
Start: 2021-06-18 | End: 2021-06-20

## 2021-06-18 RX ORDER — ONDANSETRON 8 MG/1
4 TABLET, FILM COATED ORAL EVERY 6 HOURS
Refills: 0 | Status: DISCONTINUED | OUTPATIENT
Start: 2021-06-18 | End: 2021-06-19

## 2021-06-18 RX ORDER — MORPHINE SULFATE 50 MG/1
0.1 CAPSULE, EXTENDED RELEASE ORAL ONCE
Refills: 0 | Status: DISCONTINUED | OUTPATIENT
Start: 2021-06-18 | End: 2021-06-19

## 2021-06-18 RX ORDER — SODIUM CHLORIDE 9 MG/ML
1000 INJECTION, SOLUTION INTRAVENOUS
Refills: 0 | Status: DISCONTINUED | OUTPATIENT
Start: 2021-06-18 | End: 2021-06-20

## 2021-06-18 RX ORDER — NALOXONE HYDROCHLORIDE 4 MG/.1ML
0.1 SPRAY NASAL
Refills: 0 | Status: DISCONTINUED | OUTPATIENT
Start: 2021-06-18 | End: 2021-06-19

## 2021-06-18 RX ORDER — OXYTOCIN 10 UNIT/ML
333.33 VIAL (ML) INJECTION
Qty: 20 | Refills: 0 | Status: DISCONTINUED | OUTPATIENT
Start: 2021-06-18 | End: 2021-06-20

## 2021-06-18 RX ORDER — KETOROLAC TROMETHAMINE 30 MG/ML
30 SYRINGE (ML) INJECTION EVERY 6 HOURS
Refills: 0 | Status: DISCONTINUED | OUTPATIENT
Start: 2021-06-18 | End: 2021-06-19

## 2021-06-18 RX ORDER — NALBUPHINE HYDROCHLORIDE 10 MG/ML
2.5 INJECTION, SOLUTION INTRAMUSCULAR; INTRAVENOUS; SUBCUTANEOUS EVERY 6 HOURS
Refills: 0 | Status: DISCONTINUED | OUTPATIENT
Start: 2021-06-18 | End: 2021-06-19

## 2021-06-18 RX ORDER — ACETAMINOPHEN 500 MG
1000 TABLET ORAL ONCE
Refills: 0 | Status: COMPLETED | OUTPATIENT
Start: 2021-06-18 | End: 2021-06-18

## 2021-06-18 RX ORDER — OXYCODONE HYDROCHLORIDE 5 MG/1
5 TABLET ORAL ONCE
Refills: 0 | Status: DISCONTINUED | OUTPATIENT
Start: 2021-06-18 | End: 2021-06-20

## 2021-06-18 RX ORDER — ACETAMINOPHEN 500 MG
975 TABLET ORAL
Refills: 0 | Status: DISCONTINUED | OUTPATIENT
Start: 2021-06-18 | End: 2021-06-20

## 2021-06-18 RX ORDER — IBUPROFEN 200 MG
600 TABLET ORAL EVERY 6 HOURS
Refills: 0 | Status: COMPLETED | OUTPATIENT
Start: 2021-06-18 | End: 2022-05-17

## 2021-06-18 RX ORDER — OXYCODONE HYDROCHLORIDE 5 MG/1
5 TABLET ORAL
Refills: 0 | Status: COMPLETED | OUTPATIENT
Start: 2021-06-18 | End: 2021-06-25

## 2021-06-18 RX ORDER — DEXAMETHASONE 0.5 MG/5ML
4 ELIXIR ORAL EVERY 6 HOURS
Refills: 0 | Status: DISCONTINUED | OUTPATIENT
Start: 2021-06-18 | End: 2021-06-19

## 2021-06-18 RX ADMIN — Medication 25 MILLIGRAM(S): at 23:59

## 2021-06-18 RX ADMIN — Medication 15 MILLILITER(S): at 15:46

## 2021-06-18 RX ADMIN — FAMOTIDINE 20 MILLIGRAM(S): 10 INJECTION INTRAVENOUS at 15:46

## 2021-06-18 RX ADMIN — OXYCODONE HYDROCHLORIDE 5 MILLIGRAM(S): 5 TABLET ORAL at 22:09

## 2021-06-18 RX ADMIN — Medication 400 MILLIGRAM(S): at 21:00

## 2021-06-18 RX ADMIN — Medication 30 MILLIGRAM(S): at 23:59

## 2021-06-18 NOTE — OB PROVIDER DELIVERY SUMMARY - NSSELHIDDEN_OBGYN_ALL_OB_FT
[NS_DeliveryAttending1_OBGYN_ALL_OB_FT:[id number]],[NS_DeliveryAssist1_OBGYN_ALL_OB_FT:[id number]=],[NS_DeliveryRN_OBGYN_ALL_OB_FT:[email or web address]=]

## 2021-06-18 NOTE — OB RN DELIVERY SUMMARY - NS_SEPSISRSKCALC_OBGYN_ALL_OB_FT
EOS calculated successfully. EOS Risk Factor: 0.5/1000 live births ([facility] national incidence); GA=39w;Temp=98.8; ROM=0.017; GBS='Negative'; Antibiotics='No antibiotics or any antibiotics < 2 hrs prior to birth'

## 2021-06-18 NOTE — OB PROVIDER DELIVERY SUMMARY - NSPROVIDERDELIVERYNOTE_OBGYN_ALL_OB_FT
repeat LTCS, uncomplicated  viable male infant, vertex presentation, Apgars 9/9, Wt 3248g, cord gasses sent  Hysterotomy closed in single layer using caprosyn, hysterotomy covered with interseed  Grossly normal fallopian tubes, uterus, and ovaries    EBL: 600  IVF: 1500  UOP: 250    Viki Long PGY1 repeat LTCS, uncomplicated  viable male infant, vertex presentation, Apgars 9/9, Wt 3248g, cord gasses sent  Hysterotomy closed in single layer using caprosyn, hysterotomy covered with interseed  Grossly normal fallopian tubes, uterus, and ovaries  2 grams of ancef  betadine vaginal prep  EBL: 600 ml  IVF: 1500 ml  UOP: 250 ml    Viki Long PGY1

## 2021-06-18 NOTE — OB PROVIDER H&P - HISTORY OF PRESENT ILLNESS
R1 H&P    Pt is a 36 y/o  at 39w presenting for repeat .  Patient denies contractions, vaginal bleeding, LOF, FM felt.   Last PO intake  @ 11pm  Prenatal course uncomplicated  GBS negative  EFW 3200    OBHx:  G1  eTOP w/ D&C  G2  eTOP w/ pills  G3 2017 FT , 7#11, uncomplicated  G4 2018 FT pLTCS (arrest at 9cm), 8#6, uncomplicated  GynHx: denies h/o STIs, abnormal paps, fibroids, cysts  PMHx: denies  PSHx: c/s (), D&C ()  Med: PNV  All: NKDA  SH: denies alcohol, tobacco, or drug use  Psych: denies h/o anxiety or depression    VS  HR: 69 (21 @ 14:54)  BP: 130/67 (21 @ 14:54)  Gen: NAD  Cards: RRR  Pulm: CTAB    EFH: 120s baseline, mod trinh, +accels, -decels  Toast: q2-5min  VE:deferred    A&P: 36 y/o  at 39w for repeat . Uncomplicated PNC. EFW 3200.  -routine labs  -EFM/toco  -NPO, IV hydration  -Pepcid/reglan/bicitra  Fetal: cat 1 tracing, fetal status reassuring      Viki Long PGY1

## 2021-06-18 NOTE — OB RN PATIENT PROFILE - ALERT: PERTINENT HISTORY
Pt called to schedule an appt. Advised pt since he needs updated imaging we will schedule him with MARCUS Mora. Pt agreed. Appt date, time and location given. Pt verbalized understanding.   
1st Trimester Sonogram/20 Week Level II Sonogram/Ultra Screen at 12 Weeks

## 2021-06-18 NOTE — OB RN INTRAOPERATIVE NOTE - NSSELHIDDEN_OBGYN_ALL_OB_FT
[NS_DeliveryAttending1_OBGYN_ALL_OB_FT:[id number]==],[NS_DeliveryAssist1_OBGYN_ALL_OB_FT:[id number]=],[NS_DeliveryRN_OBGYN_ALL_OB_FT:[email or web address]=]

## 2021-06-18 NOTE — OB NEONATOLOGY/PEDIATRICIAN DELIVERY SUMMARY - NSPEDSNEONOTESA_OBGYN_ALL_OB_FT
Baby boy born at 39 wks via  to a 36 y/o  blood type O+ mother. No significant maternal or prenatal history. PNL nr/immune/-, GBS unknown. No rupture or labor, clear fluids at uterine incision. Baby emerged vigorous, crying, was w/d/s/s with APGARS of 9/9. Mom would like to bottle feed, consents Hep B and consents circ. EOS N/A (highest maternal temp 37.1 degC).

## 2021-06-18 NOTE — OB RN DELIVERY SUMMARY - NSSELHIDDEN_OBGYN_ALL_OB_FT
[NS_DeliveryAttending1_OBGYN_ALL_OB_FT:[id number]==],[NS_DeliveryAssist1_OBGYN_ALL_OB_FT:[id number]=],[NS_DeliveryRN_OBGYN_ALL_OB_FT:[id number]=]

## 2021-06-19 ENCOUNTER — TRANSCRIPTION ENCOUNTER (OUTPATIENT)
Age: 36
End: 2021-06-19

## 2021-06-19 LAB
BASOPHILS # BLD AUTO: 0.01 K/UL — SIGNIFICANT CHANGE UP (ref 0–0.2)
BASOPHILS NFR BLD AUTO: 0.1 % — SIGNIFICANT CHANGE UP (ref 0–2)
EOSINOPHIL # BLD AUTO: 0 K/UL — SIGNIFICANT CHANGE UP (ref 0–0.5)
EOSINOPHIL NFR BLD AUTO: 0 % — SIGNIFICANT CHANGE UP (ref 0–6)
HCT VFR BLD CALC: 30.5 % — LOW (ref 34.5–45)
HGB BLD-MCNC: 9.1 G/DL — LOW (ref 11.5–15.5)
IMM GRANULOCYTES NFR BLD AUTO: 0.7 % — SIGNIFICANT CHANGE UP (ref 0–1.5)
LYMPHOCYTES # BLD AUTO: 0.62 K/UL — LOW (ref 1–3.3)
LYMPHOCYTES # BLD AUTO: 7.6 % — LOW (ref 13–44)
MCHC RBC-ENTMCNC: 24.3 PG — LOW (ref 27–34)
MCHC RBC-ENTMCNC: 29.8 GM/DL — LOW (ref 32–36)
MCV RBC AUTO: 81.6 FL — SIGNIFICANT CHANGE UP (ref 80–100)
MONOCYTES # BLD AUTO: 0.47 K/UL — SIGNIFICANT CHANGE UP (ref 0–0.9)
MONOCYTES NFR BLD AUTO: 5.7 % — SIGNIFICANT CHANGE UP (ref 2–14)
NEUTROPHILS # BLD AUTO: 7.05 K/UL — SIGNIFICANT CHANGE UP (ref 1.8–7.4)
NEUTROPHILS NFR BLD AUTO: 85.9 % — HIGH (ref 43–77)
NRBC # BLD: 0 /100 WBCS — SIGNIFICANT CHANGE UP (ref 0–0)
PLATELET # BLD AUTO: 126 K/UL — LOW (ref 150–400)
RBC # BLD: 3.74 M/UL — LOW (ref 3.8–5.2)
RBC # FLD: 16 % — HIGH (ref 10.3–14.5)
T PALLIDUM AB TITR SER: NEGATIVE — SIGNIFICANT CHANGE UP
WBC # BLD: 8.21 K/UL — SIGNIFICANT CHANGE UP (ref 3.8–10.5)
WBC # FLD AUTO: 8.21 K/UL — SIGNIFICANT CHANGE UP (ref 3.8–10.5)

## 2021-06-19 RX ORDER — OXYCODONE HYDROCHLORIDE 5 MG/1
5 TABLET ORAL ONCE
Refills: 0 | Status: DISCONTINUED | OUTPATIENT
Start: 2021-06-19 | End: 2021-06-19

## 2021-06-19 RX ORDER — LANOLIN
1 OINTMENT (GRAM) TOPICAL
Qty: 0 | Refills: 0 | DISCHARGE
Start: 2021-06-19

## 2021-06-19 RX ORDER — MAGNESIUM HYDROXIDE 400 MG/1
30 TABLET, CHEWABLE ORAL
Qty: 0 | Refills: 0 | DISCHARGE
Start: 2021-06-19

## 2021-06-19 RX ORDER — OXYCODONE HYDROCHLORIDE 5 MG/1
5 TABLET ORAL
Refills: 0 | Status: DISCONTINUED | OUTPATIENT
Start: 2021-06-19 | End: 2021-06-20

## 2021-06-19 RX ORDER — OXYCODONE HYDROCHLORIDE 5 MG/1
1 TABLET ORAL
Qty: 12 | Refills: 0
Start: 2021-06-19 | End: 2021-06-21

## 2021-06-19 RX ORDER — ACETAMINOPHEN 500 MG
2 TABLET ORAL
Qty: 0 | Refills: 0 | DISCHARGE
Start: 2021-06-19

## 2021-06-19 RX ORDER — IBUPROFEN 200 MG
3 TABLET ORAL
Qty: 0 | Refills: 0 | DISCHARGE
Start: 2021-06-19

## 2021-06-19 RX ADMIN — OXYCODONE HYDROCHLORIDE 5 MILLIGRAM(S): 5 TABLET ORAL at 21:41

## 2021-06-19 RX ADMIN — OXYCODONE HYDROCHLORIDE 5 MILLIGRAM(S): 5 TABLET ORAL at 03:25

## 2021-06-19 RX ADMIN — Medication 975 MILLIGRAM(S): at 22:11

## 2021-06-19 RX ADMIN — Medication 30 MILLIGRAM(S): at 19:00

## 2021-06-19 RX ADMIN — Medication 30 MILLIGRAM(S): at 06:35

## 2021-06-19 RX ADMIN — Medication 975 MILLIGRAM(S): at 09:37

## 2021-06-19 RX ADMIN — OXYCODONE HYDROCHLORIDE 5 MILLIGRAM(S): 5 TABLET ORAL at 02:56

## 2021-06-19 RX ADMIN — Medication 975 MILLIGRAM(S): at 09:07

## 2021-06-19 RX ADMIN — Medication 30 MILLIGRAM(S): at 12:24

## 2021-06-19 RX ADMIN — OXYCODONE HYDROCHLORIDE 5 MILLIGRAM(S): 5 TABLET ORAL at 14:15

## 2021-06-19 RX ADMIN — OXYCODONE HYDROCHLORIDE 5 MILLIGRAM(S): 5 TABLET ORAL at 09:12

## 2021-06-19 RX ADMIN — Medication 975 MILLIGRAM(S): at 03:25

## 2021-06-19 RX ADMIN — HEPARIN SODIUM 5000 UNIT(S): 5000 INJECTION INTRAVENOUS; SUBCUTANEOUS at 00:00

## 2021-06-19 RX ADMIN — HEPARIN SODIUM 5000 UNIT(S): 5000 INJECTION INTRAVENOUS; SUBCUTANEOUS at 12:24

## 2021-06-19 RX ADMIN — Medication 975 MILLIGRAM(S): at 16:16

## 2021-06-19 RX ADMIN — OXYCODONE HYDROCHLORIDE 5 MILLIGRAM(S): 5 TABLET ORAL at 09:42

## 2021-06-19 RX ADMIN — Medication 30 MILLIGRAM(S): at 06:05

## 2021-06-19 RX ADMIN — OXYCODONE HYDROCHLORIDE 5 MILLIGRAM(S): 5 TABLET ORAL at 04:56

## 2021-06-19 RX ADMIN — Medication 1 TABLET(S): at 18:43

## 2021-06-19 RX ADMIN — SIMETHICONE 80 MILLIGRAM(S): 80 TABLET, CHEWABLE ORAL at 13:47

## 2021-06-19 RX ADMIN — Medication 30 MILLIGRAM(S): at 18:29

## 2021-06-19 RX ADMIN — OXYCODONE HYDROCHLORIDE 5 MILLIGRAM(S): 5 TABLET ORAL at 13:46

## 2021-06-19 RX ADMIN — OXYCODONE HYDROCHLORIDE 5 MILLIGRAM(S): 5 TABLET ORAL at 22:11

## 2021-06-19 RX ADMIN — SIMETHICONE 80 MILLIGRAM(S): 80 TABLET, CHEWABLE ORAL at 06:04

## 2021-06-19 RX ADMIN — Medication 30 MILLIGRAM(S): at 12:54

## 2021-06-19 RX ADMIN — OXYCODONE HYDROCHLORIDE 5 MILLIGRAM(S): 5 TABLET ORAL at 05:25

## 2021-06-19 RX ADMIN — Medication 975 MILLIGRAM(S): at 15:46

## 2021-06-19 RX ADMIN — Medication 30 MILLIGRAM(S): at 00:40

## 2021-06-19 RX ADMIN — Medication 975 MILLIGRAM(S): at 02:53

## 2021-06-19 RX ADMIN — Medication 975 MILLIGRAM(S): at 21:41

## 2021-06-19 NOTE — CHART NOTE - NSCHARTNOTEFT_GEN_A_CORE
Day 1 of Anesthesia Pain Management Service    SUBJECTIVE:  Pain Scale Score:          [X] Refer to charted pain scores    THERAPY:    s/p neuraxial duramorph      MEDICATIONS  (STANDING):  acetaminophen   Tablet .. 975 milliGRAM(s) Oral <User Schedule>  ceFAZolin   IVPB 2000 milliGRAM(s) IV Intermittent once  chlorhexidine 2% Cloths 1 Application(s) Topical once  diphtheria/tetanus/pertussis (acellular) Vaccine (ADAcel) 0.5 milliLiter(s) IntraMuscular once  heparin   Injectable 5000 Unit(s) SubCutaneous every 12 hours  ibuprofen  Tablet. 600 milliGRAM(s) Oral every 6 hours  ketorolac   Injectable 30 milliGRAM(s) IV Push every 6 hours  lactated ringers. 1000 milliLiter(s) (125 mL/Hr) IV Continuous <Continuous>  morphine PF Spinal 0.1 milliGRAM(s) IntraThecal. once  oxytocin Infusion 333.333 milliUNIT(s)/Min (1000 mL/Hr) IV Continuous <Continuous>  oxytocin Infusion 333.333 milliUNIT(s)/Min (1000 mL/Hr) IV Continuous <Continuous>  prenatal multivitamin 1 Tablet(s) Oral daily    MEDICATIONS  (PRN):  dexAMETHasone  Injectable 4 milliGRAM(s) IV Push every 6 hours PRN Nausea  diphenhydrAMINE 25 milliGRAM(s) Oral every 6 hours PRN Pruritus  lanolin Ointment 1 Application(s) Topical every 6 hours PRN Sore Nipples  magnesium hydroxide Suspension 30 milliLiter(s) Oral two times a day PRN Constipation  nalbuphine Injectable 2.5 milliGRAM(s) IV Push every 6 hours PRN Pruritus  naloxone Injectable 0.1 milliGRAM(s) IV Push every 3 minutes PRN For ANY of the following changes in patient status:  A. Breaths Per Minute LESS THAN 10, B. Oxygen saturation LESS THAN 90%, C. Sedation score of 6 for Stop After: 4 Times  ondansetron Injectable 4 milliGRAM(s) IV Push every 6 hours PRN Nausea  oxyCODONE    IR 5 milliGRAM(s) Oral every 3 hours PRN Moderate to Severe Pain (4-10)  oxyCODONE    IR 5 milliGRAM(s) Oral once PRN Moderate to Severe Pain (4-10)  simethicone 80 milliGRAM(s) Chew every 4 hours PRN Gas      OBJECTIVE:    Sedation:        	[X] Alert  	[ ] Drowsy    [ ] Asleep       [ ] Unresponsive    Side Effects:	[X] None	[ ] Nausea	[ ] Vomiting         [ ] Pruritus  		[ ] Weakness            [ ] Numbness	          [ ] Other:    Vital Signs Last 24 Hrs  T(C): 36.8 (19 Jun 2021 13:00), Max: 37.1 (18 Jun 2021 14:54)  T(F): 98.2 (19 Jun 2021 13:00), Max: 98.8 (18 Jun 2021 15:35)  HR: 74 (19 Jun 2021 13:00) (50 - 74)  BP: 96/62 (19 Jun 2021 13:00) (96/62 - 144/69)  BP(mean): 79 (18 Jun 2021 22:00) (79 - 100)  RR: 18 (19 Jun 2021 13:00) (15 - 22)  SpO2: 96% (19 Jun 2021 13:00) (94% - 99%)    ASSESSMENT/ PLAN  [X] Patient transitioned to prn analgesics  [X] Pain management per primary service, pain service to sign off   [X]Documentation and Verification of current medications

## 2021-06-19 NOTE — DISCHARGE NOTE OB - CARE PLAN
Principal Discharge DX:	39 weeks gestation of pregnancy  Goal:	Safe delivery of patient  Assessment and plan of treatment:	Nothing in the vagina, no tampons, douches, baths, swimming or sex for 6-8 weeks.  Regular diet, follow up visit in 3 weeks  Secondary Diagnosis:	Previous  section

## 2021-06-19 NOTE — DISCHARGE NOTE OB - HOSPITAL COURSE
34 y/o   admitted at 39 weeks with h/o previous c/s for repeat c/s. She was delivered of a viable baby boy. Apgars were 9/9 and wt 7'9"  both mother and baby have done well.

## 2021-06-19 NOTE — DISCHARGE NOTE OB - CARE PROVIDER_API CALL
Anaid Valentin)  Obstetrics and Gynecology  [address] [place] [address]  Phone: [phone]  Fax: [phone]  Established Patient  Follow Up Time: 1 week

## 2021-06-19 NOTE — DISCHARGE NOTE OB - MATERIALS PROVIDED
Vaccinations/[facility]  Screening Program/Bottle Feeding Log/Guide to Postpartum Care/[facility] Hearing Screen Program/Back To Sleep Handout/Shaken Baby Prevention Handout/Birth Certificate Instructions

## 2021-06-19 NOTE — DISCHARGE NOTE OB - PATIENT PORTAL LINK FT
You can access the FollowMyHealth Patient Portal offered by [facility] by registering at the following website: [email or web address]. By joining [facility]’s FollowMyHealth portal, you will also be able to view your health information using other applications (apps) compatible with our system.

## 2021-06-19 NOTE — DISCHARGE NOTE OB - MEDICATION SUMMARY - MEDICATIONS TO TAKE
I will START or STAY ON the medications listed below when I get home from the hospital:    acetaminophen 500 mg oral tablet  -- 2 tab(s) by mouth every 6 hours, not to exceed 4000 mg/day  -- Indication: For Pain    ibuprofen 200 mg oral tablet  -- 3 tab(s) by mouth every 6 hours  -- Indication: For Pain    oxyCODONE 5 mg oral tablet  -- 1 tab(s) by mouth every 6 hours, As Needed MDD:4 tabs/day   -- Indication: For sever pain    magnesium hydroxide 8% oral suspension  -- 30 milliliter(s) by mouth 2 times a day, As needed, Constipation  -- Indication: For constipation    lanolin topical ointment  -- 1 application on skin every 6 hours, As needed, Sore Nipples  -- Indication: For sore nipples    Prenatal Multivitamins with Folic Acid 1 mg oral tablet  -- 1 tab(s) by mouth once a day  -- Indication: For vitamin

## 2021-06-20 VITALS
HEART RATE: 60 BPM | TEMPERATURE: 98 F | SYSTOLIC BLOOD PRESSURE: 111 MMHG | RESPIRATION RATE: 18 BRPM | DIASTOLIC BLOOD PRESSURE: 76 MMHG | OXYGEN SATURATION: 95 %

## 2021-06-20 PROCEDURE — 86900 BLOOD TYPING SEROLOGIC ABO: CPT

## 2021-06-20 PROCEDURE — 59025 FETAL NON-STRESS TEST: CPT

## 2021-06-20 PROCEDURE — 85025 COMPLETE CBC W/AUTO DIFF WBC: CPT

## 2021-06-20 PROCEDURE — 59050 FETAL MONITOR W/REPORT: CPT

## 2021-06-20 PROCEDURE — 86901 BLOOD TYPING SEROLOGIC RH(D): CPT

## 2021-06-20 PROCEDURE — 86780 TREPONEMA PALLIDUM: CPT

## 2021-06-20 PROCEDURE — 86850 RBC ANTIBODY SCREEN: CPT

## 2021-06-20 RX ORDER — IBUPROFEN 200 MG
600 TABLET ORAL EVERY 6 HOURS
Refills: 0 | Status: DISCONTINUED | OUTPATIENT
Start: 2021-06-20 | End: 2021-06-20

## 2021-06-20 RX ADMIN — SIMETHICONE 80 MILLIGRAM(S): 80 TABLET, CHEWABLE ORAL at 01:22

## 2021-06-20 RX ADMIN — Medication 975 MILLIGRAM(S): at 10:07

## 2021-06-20 RX ADMIN — OXYCODONE HYDROCHLORIDE 5 MILLIGRAM(S): 5 TABLET ORAL at 16:38

## 2021-06-20 RX ADMIN — Medication 975 MILLIGRAM(S): at 03:06

## 2021-06-20 RX ADMIN — OXYCODONE HYDROCHLORIDE 5 MILLIGRAM(S): 5 TABLET ORAL at 00:51

## 2021-06-20 RX ADMIN — Medication 600 MILLIGRAM(S): at 00:52

## 2021-06-20 RX ADMIN — OXYCODONE HYDROCHLORIDE 5 MILLIGRAM(S): 5 TABLET ORAL at 01:21

## 2021-06-20 RX ADMIN — Medication 975 MILLIGRAM(S): at 09:37

## 2021-06-20 RX ADMIN — Medication 1 TABLET(S): at 12:05

## 2021-06-20 RX ADMIN — Medication 600 MILLIGRAM(S): at 12:36

## 2021-06-20 RX ADMIN — OXYCODONE HYDROCHLORIDE 5 MILLIGRAM(S): 5 TABLET ORAL at 09:59

## 2021-06-20 RX ADMIN — OXYCODONE HYDROCHLORIDE 5 MILLIGRAM(S): 5 TABLET ORAL at 16:09

## 2021-06-20 RX ADMIN — HEPARIN SODIUM 5000 UNIT(S): 5000 INJECTION INTRAVENOUS; SUBCUTANEOUS at 00:15

## 2021-06-20 RX ADMIN — Medication 600 MILLIGRAM(S): at 12:06

## 2021-06-20 RX ADMIN — Medication 975 MILLIGRAM(S): at 15:55

## 2021-06-20 RX ADMIN — OXYCODONE HYDROCHLORIDE 5 MILLIGRAM(S): 5 TABLET ORAL at 10:29

## 2021-06-20 RX ADMIN — Medication 600 MILLIGRAM(S): at 01:22

## 2021-06-20 RX ADMIN — Medication 600 MILLIGRAM(S): at 06:32

## 2021-06-20 RX ADMIN — Medication 975 MILLIGRAM(S): at 03:36

## 2021-06-20 RX ADMIN — Medication 975 MILLIGRAM(S): at 16:25

## 2021-06-20 RX ADMIN — SIMETHICONE 80 MILLIGRAM(S): 80 TABLET, CHEWABLE ORAL at 12:06

## 2021-06-20 NOTE — PROGRESS NOTE ADULT - SUBJECTIVE AND OBJECTIVE BOX
OB Progress Note:  Delivery, POD#1    S: 34yo POD#1 s/p rLTCS . Her pain is well controlled. She is tolerating a regular diet and is not passing flatus. Denies N/V. Denies CP/SOB/lightheadedness/dizziness. Endorses light vaginal bleeding, less than one pad per hour. She is ambulating without difficulty. Voiding spontaneously.     O:   Vital Signs Last 24 Hrs  T(C): 36.6 (2021 23:30), Max: 37.1 (2021 14:54)  T(F): 97.9 (2021 23:30), Max: 98.8 (2021 15:35)  HR: 62 (2021 23:30) (50 - 69)  BP: 133/79 (2021 23:30) (114/58 - 144/69)  BP(mean): 79 (2021 22:00) (79 - 100)  RR: 18 (2021 02:55) (15 - 22)  SpO2: 96% (2021 23:30) (94% - 98%)    Labs:  Blood type: O Positive  Rubella IgG: RPR:               PE:  General: NAD  Heart: extremities well-perfused  Lungs: breathing comfortably  Abdomen: Mildly distended, appropriately tender, firm fundus, incision c/d/i  Extremities: No erythema, no pitting edema  
Patient seen and examined at bedside, no acute overnight events. No acute concerns, pain well controlled. Patient is ambulating, voiding spontaneously, passing flatus, and tolerating regular diet. Denies CP, SOB, N/V, HA, blurred vision, epigastric pain.    Vital Signs Last 24 Hours  T(C): 36.8 (06-20-21 @ 01:18), Max: 36.8 (06-19-21 @ 04:44)  HR: 75 (06-20-21 @ 01:18) (62 - 75)  BP: 104/65 (06-20-21 @ 01:18) (96/62 - 124/79)  RR: 18 (06-20-21 @ 01:18) (18 - 18)  SpO2: 94% (06-20-21 @ 01:18) (94% - 99%)    Physical Exam:  General: NAD  Abdomen: Soft, non-tender, non-distended, fundus firm  Incision: Pfannensteil incision CDI, subcuticular suture closure  Pelvic: Lochia wnl    Labs:    Blood Type: O Positive  Antibody Screen: Negative  RPR: Negative               9.1    8.21  )-----------( 126      ( 06-19 @ 06:28 )             30.5                9.9    5.19  )-----------( 131      ( 06-15 @ 10:04 )             32.3         MEDICATIONS  (STANDING):  acetaminophen   Tablet .. 975 milliGRAM(s) Oral <User Schedule>  ceFAZolin   IVPB 2000 milliGRAM(s) IV Intermittent once  chlorhexidine 2% Cloths 1 Application(s) Topical once  diphtheria/tetanus/pertussis (acellular) Vaccine (ADAcel) 0.5 milliLiter(s) IntraMuscular once  heparin   Injectable 5000 Unit(s) SubCutaneous every 12 hours  ibuprofen  Tablet. 600 milliGRAM(s) Oral every 6 hours  lactated ringers. 1000 milliLiter(s) (125 mL/Hr) IV Continuous <Continuous>  oxytocin Infusion 333.333 milliUNIT(s)/Min (1000 mL/Hr) IV Continuous <Continuous>  oxytocin Infusion 333.333 milliUNIT(s)/Min (1000 mL/Hr) IV Continuous <Continuous>  prenatal multivitamin 1 Tablet(s) Oral daily    MEDICATIONS  (PRN):  diphenhydrAMINE 25 milliGRAM(s) Oral every 6 hours PRN Pruritus  lanolin Ointment 1 Application(s) Topical every 6 hours PRN Sore Nipples  magnesium hydroxide Suspension 30 milliLiter(s) Oral two times a day PRN Constipation  oxyCODONE    IR 5 milliGRAM(s) Oral every 3 hours PRN Moderate to Severe Pain (4-10)  oxyCODONE    IR 5 milliGRAM(s) Oral once PRN Moderate to Severe Pain (4-10)  simethicone 80 milliGRAM(s) Chew every 4 hours PRN Gas      
 Section/Postpartum    ZULEYKA MONTGOMERY is a  35y woman   , who is now post-operative day 2.    PAST MEDICAL & SURGICAL HISTORY:  No pertinent past medical history    H/O:   2018        Subjective:  The patient feels well.  She is ambulating without difficulty.  She is tolerating PO.  She is voiding.  She denies nausea and vomiting.  Her pain is controlled.  She reports normal postpartum bleeding  She is breastfeeding.  She is formula feeding.    Physical exam:    Vital Signs Last 24 Hrs  T(C): 36.6 (2021 05:17), Max: 36.8 (2021 13:00)  T(F): 97.8 (2021 05:17), Max: 98.2 (2021 13:00)  HR: 60 (2021 05:17) (60 - 75)  BP: 111/76 (2021 05:17) (96/62 - 124/79)  BP(mean): --  RR: 18 (2021 05:17) (18 - 18)  SpO2: 95% (2021 05:17) (94% - 98%)    General Apperance: alert and oriented in no acute distress  Breast: Soft, nontender, non engorged  Abdomen: Soft, nontender, not distended,  Uterine fundus is firm and below the umbilicus, BS(+), Flatus(+), Bowel Movement (+)  Incision: Clean, dry, and intact   Pelvic: Normal lochia noted  Ext: No calf tenderness, No edema or cyanosis  Lochia: not excessive    LABS:                        9.1    8.21  )-----------( 126      ( 2021 06:28 )             30.5     Blood Type: ABO Interpretation: O   Rh Interpretation: Positive   Antibody Screen: Negative   Rubella status:  Immune    Allergies    No Known Allergies    Intolerances        MEDICATIONS  (STANDING):  acetaminophen   Tablet .. 975 milliGRAM(s) Oral <User Schedule>  ceFAZolin   IVPB 2000 milliGRAM(s) IV Intermittent once  chlorhexidine 2% Cloths 1 Application(s) Topical once  diphtheria/tetanus/pertussis (acellular) Vaccine (ADAcel) 0.5 milliLiter(s) IntraMuscular once  heparin   Injectable 5000 Unit(s) SubCutaneous every 12 hours  ibuprofen  Tablet. 600 milliGRAM(s) Oral every 6 hours  lactated ringers. 1000 milliLiter(s) (125 mL/Hr) IV Continuous <Continuous>  oxytocin Infusion 333.333 milliUNIT(s)/Min (1000 mL/Hr) IV Continuous <Continuous>  oxytocin Infusion 333.333 milliUNIT(s)/Min (1000 mL/Hr) IV Continuous <Continuous>  prenatal multivitamin 1 Tablet(s) Oral daily    MEDICATIONS  (PRN):  diphenhydrAMINE 25 milliGRAM(s) Oral every 6 hours PRN Pruritus  lanolin Ointment 1 Application(s) Topical every 6 hours PRN Sore Nipples  magnesium hydroxide Suspension 30 milliLiter(s) Oral two times a day PRN Constipation  oxyCODONE    IR 5 milliGRAM(s) Oral once PRN Moderate to Severe Pain (4-10)  oxyCODONE    IR 5 milliGRAM(s) Oral every 3 hours PRN Moderate to Severe Pain (4-10)  simethicone 80 milliGRAM(s) Chew every 4 hours PRN Gas        Assessment and Plan:    POD # 2  S/P RCS         Doing well.  Encouraged to ambulate.  Drink plenty of water and fluids.  Discharge home today.  Paient to take ibuprofen 600mg every 6 hours and/or Tylenol 2 (325mg tablet) tablets every 6 hoursfor pain  Follow up in 1-2 week(s) for incision check, ZULEYKA MONTGOMERY is to call the office for an appointment.  To use abdominal binder while awake as needed  Verbal discharge instructions d/w patient  - discharge summary to be given to her on discharge for the hospital.  Call for fevers, chills, nausea, vomiting, heavy vaginal bleeding, vaginal discharge, severe pain, symptoms of depression, problems with incision or any other concerning symptoms.  Nothing in vagina and no heavy lifting for  6-8 weeks. No sex!  No driving x 2 weeks, Do not drive if you are taking narcotics.  Patient to continue with prenatal vitamins.           
 Section/Postpartum    ZULEYKA MONTGOMERY is a  35y woman , who is now post-operative day: 1    PAST MEDICAL & SURGICAL HISTORY:  No pertinent past medical history    H/O:   2018        Subjective:  The patient feels well.  She is ambulating.   She is tolerating regular diet.  She denies nausea and vomiting.  She is voiding.  Her pain is controlled.  She reports normal postpartum bleeding.  She is breastfeeding.  She is formula feeding.    Physical exam:    Vital Signs Last 24 Hrs  T(C): 36.8 (2021 13:00), Max: 37.1 (2021 16:23)  T(F): 98.2 (2021 13:00), Max: 98.2 (2021 04:44)  HR: 74 (2021 13:00) (50 - 74)  BP: 96/62 (2021 13:00) (96/62 - 144/69)  BP(mean): 79 (2021 22:00) (79 - 100)  RR: 18 (2021 13:00) (15 - 22)  SpO2: 96% (2021 13:00) (94% - 99%)    General: alert and oriented in no acute distress.  Breast: Soft, nontender, not engorged.  Abdomen: Soft, nontender, not distended ,  Uterine fundus is firm and at below the umbilicus, BS (+), Flatus (+), Bowel Movement (-)  Incision: Clean, dry, and intact, bandage has been removed  Pelvic: Normal lochia noted  Ext: No calf tenderness  Lochia: not excessive    LABS:                        9.1    8.21  )-----------( 126      ( 2021 06:28 )             30.5       Rubella status:  Immune    Blood Type:  ABO Interpretation: O   Rh Interpretation: Positive   Antibody Screen: Negative                     Allergies    No Known Allergies    Intolerances        MEDICATIONS  (STANDING):  acetaminophen   Tablet .. 975 milliGRAM(s) Oral <User Schedule>  ceFAZolin   IVPB 2000 milliGRAM(s) IV Intermittent once  chlorhexidine 2% Cloths 1 Application(s) Topical once  diphtheria/tetanus/pertussis (acellular) Vaccine (ADAcel) 0.5 milliLiter(s) IntraMuscular once  heparin   Injectable 5000 Unit(s) SubCutaneous every 12 hours  ibuprofen  Tablet. 600 milliGRAM(s) Oral every 6 hours  ketorolac   Injectable 30 milliGRAM(s) IV Push every 6 hours  lactated ringers. 1000 milliLiter(s) (125 mL/Hr) IV Continuous <Continuous>  morphine PF Spinal 0.1 milliGRAM(s) IntraThecal. once  oxytocin Infusion 333.333 milliUNIT(s)/Min (1000 mL/Hr) IV Continuous <Continuous>  oxytocin Infusion 333.333 milliUNIT(s)/Min (1000 mL/Hr) IV Continuous <Continuous>  prenatal multivitamin 1 Tablet(s) Oral daily    MEDICATIONS  (PRN):  dexAMETHasone  Injectable 4 milliGRAM(s) IV Push every 6 hours PRN Nausea  diphenhydrAMINE 25 milliGRAM(s) Oral every 6 hours PRN Pruritus  lanolin Ointment 1 Application(s) Topical every 6 hours PRN Sore Nipples  magnesium hydroxide Suspension 30 milliLiter(s) Oral two times a day PRN Constipation  nalbuphine Injectable 2.5 milliGRAM(s) IV Push every 6 hours PRN Pruritus  naloxone Injectable 0.1 milliGRAM(s) IV Push every 3 minutes PRN For ANY of the following changes in patient status:  A. Breaths Per Minute LESS THAN 10, B. Oxygen saturation LESS THAN 90%, C. Sedation score of 6 for Stop After: 4 Times  ondansetron Injectable 4 milliGRAM(s) IV Push every 6 hours PRN Nausea  oxyCODONE    IR 5 milliGRAM(s) Oral every 3 hours PRN Moderate to Severe Pain (4-10)  oxyCODONE    IR 5 milliGRAM(s) Oral once PRN Moderate to Severe Pain (4-10)  simethicone 80 milliGRAM(s) Chew every 4 hours PRN Gas        Assessment and Plan:    POD # 1 s/p RCS       Doing well.  Encourage ambulation, may shower, routine postop care.

## 2021-06-20 NOTE — PROGRESS NOTE ADULT - ASSESSMENT
A/P: 34yo POD#1 s/p LTCS.  Patient is stable and doing well post-operatively.    - Continue regular diet.  - OOB, Increase ambulation.  - Continue motrin, tylenol, oxycodone PRN for pain control.     -Incision dressing removed  - F/u AM CBC    Alicia Garcia, PGY1
A/P: 34yo POD#1 s/p rLTCS (EBL=600).  Patient is stable and doing well post-operatively.      - Continue with po analgesia  - Increase ambulation  - Continue regular diet  - saline IV lock  - No labs    Ting Mullins, PGY-2
stable
stable

## 2021-06-21 NOTE — OB PST NOTE - NSFORMULAFEEDINGREASONS_OBGYN_ALL_OB
Detail Level: Detailed Depth Of Biopsy: dermis Was A Bandage Applied: Yes Size Of Lesion In Cm: 1.2 X Size Of Lesion In Cm: 0 Biopsy Type: H and E Biopsy Method: double edge Personna blade Anesthesia Type: 1% lidocaine with epinephrine Anesthesia Volume In Cc (Will Not Render If 0): 0.5 Mother's informed choice Hemostasis: Aluminum Chloride Wound Care: Petrolatum Dressing: bandage Destruction After The Procedure: No Type Of Destruction Used: Curettage Curettage Text: The wound bed was treated with curettage after the biopsy was performed. Cryotherapy Text: The wound bed was treated with cryotherapy after the biopsy was performed. Electrodesiccation Text: The wound bed was treated with electrodesiccation after the biopsy was performed. Electrodesiccation And Curettage Text: The wound bed was treated with electrodesiccation and curettage after the biopsy was performed. Silver Nitrate Text: The wound bed was treated with silver nitrate after the biopsy was performed. Lab: 6 Lab Facility: 3 Consent: Written consent was obtained and risks were reviewed including but not limited to scarring, infection, bleeding, scabbing, incomplete removal, nerve damage and allergy to anesthesia. Post-Care Instructions: I reviewed with the patient in detail post-care instructions. Wash site twice daily with gentle soap and water and apply vaseline and bandage. Notification Instructions: Patient will be notified of biopsy results. However, patient instructed to call the office if not contacted within 2 weeks. Billing Type: Third-Party Bill Information: Selecting Yes will display possible errors in your note based on the variables you have selected. This validation is only offered as a suggestion for you. PLEASE NOTE THAT THE VALIDATION TEXT WILL BE REMOVED WHEN YOU FINALIZE YOUR NOTE. IF YOU WANT TO FAX A PRELIMINARY NOTE YOU WILL NEED TO TOGGLE THIS TO 'NO' IF YOU DO NOT WANT IT IN YOUR FAXED NOTE. Size Of Lesion In Cm: 0.8

## 2021-07-01 NOTE — HISTORY OF PRESENT ILLNESS
[Other Location: e.g. Home (Enter Location, City,State)___] : at [unfilled] [Home] : at home, [unfilled] , at the time of the visit. [Other:____] : [unfilled] [Verbal consent obtained from patient] : the patient, [unfilled] [Medical Office: ([facility])___] : at the medical office located in

## 2021-07-01 NOTE — HISTORY OF PRESENT ILLNESS
[Other Location: e.g. Home (Enter Location, City,State)___] : at [unfilled] [Home] : at home, [unfilled] , at the time of the visit. [Other:____] : [unfilled] [Verbal consent obtained from patient] : the patient, [unfilled] [Medical Office: ([facility])___] : at the medical office located in

## 2022-08-25 NOTE — OB RN PATIENT PROFILE - PRO FEEDING PLAN INFANT OB
Left message for Prabhjot to return my call in order to scheduled an appt with Dr Stern for pt due to vision problems.   formula feeding

## 2022-11-30 NOTE — OB RN DELIVERY SUMMARY - BABY A: APGAR 5 MIN RESP RATE, DELIVERY
(2) good, crying 5-Fu Counseling: 5-Fluorouracil Counseling:  I discussed with the patient the risks of 5-fluorouracil including but not limited to erythema, scaling, itching, weeping, crusting, and pain.

## 2025-02-27 NOTE — PRE-ANESTHESIA EVALUATION ADULT - NSANTHDISPORD_GEN_ALL_CORE

## 2025-03-10 NOTE — OB RN PATIENT PROFILE - NSCTXPAIN_OBGYN_ALL_OB
Pre-Operative Instructions    Patient name: Jeremias Cornell         We have scheduled you today for a GI procedure that will be performed sometime within the next few months. Because we realize that there may be some changes in the maintenance of your health after today, but prior to your procedure, we ask that you note the following:    If you have any additional medications that are added to the current medications you are taking, please notify our office so that we can properly instruct you in how to take this around the time of your procedure. For example,  if you started on any type of blood thinner, any type of anti inflammatory medication, or any type of iron supplement or vitamin, these medications will need to be stopped, depending on what they are, several days BEFORE the procedure. If you are given pain medications or any new type of heart medication or blood pressure pills or are a newly diagnoses diabetic put on blood sugar medication, we may also need to make adjustments regarding these.  If you have any type of device implanted (pacemaker, defibrillator, implanted pain device or stimulator, or have any kind of joint replacement) please let us know, as special arrangements may need to be made in order for the procedure to be performed.  If you develop new allergies, such as Latex or Versed, special arrangements may need to be made.  We will also need to be made aware of any changes in your insurance as we want to be sure that you receive the full benefit of your plan.      We understand that situations may arise causing you to cancel and/or reschedule your procedure date. We would appreciate at least two weeks notice. Thank you for your cooperation.    The staff of the [facility] offers these suggestions if you are scheduled for surgery.    Plan for unforeseen changes in surgery time. Although we try to maintain a set surgery schedule, there are times when a surgery  case may take longer than expected. This may result in your being in the surgery center longer than originally planned.      Arrange for an adult to stay with you at the surgery center. It is very important that you have an adult stay with you at the surgery center during your procedure. The medications you receive can make you sleepy and forgetful. For this reason, the doctor may want to discuss your surgery and post-operative care with an adult friend or family member. Additionally, an adult will be needed to drive you home. An adult must stay with you for the first 24 hours after your surgery. IF YOU DO  NOT HAVE AN ADULT TO DRIVE YOU HOME, YOUR SURGERY WILL BE CANCELLED.     Remember to follow pre-operative dietary restrictions. An empty stomach is imperative to prevent nausea or vomiting during and after your procedure.      Things to bring with you:  a list of your current medications (including \"over the counter\" and herbal medications)  important legal documents such as Power of , Guardianship papers  any assistive device you are currently using (crutches, walker, hearing aid, etc.)    Limit visitors while you are at the surgery center. The time spent at the surgery center is very brief and will be limited to preparing you for surgery and assisting you to recover afterwards. Surgery center rooms are very small, which requires us to limit the number of visitors allowed in at one time.      Avoid alcoholic beverages. Because you will be asked to \"fast\" before your procedure, your body will be in a naturally dehydrated state. Alcohol will add to this dehydration making you more prone to problems with blood pressure during and after your procedure You should avoid alcoholic beverages for at least 24 hours prior to your surgery.    Wear loose comfortable clothes. Clothes that are easy to put on and take off are best. Sweat pants, shirts with buttons, and slip-on shoes are wise choices. Avoid tight-fitting  clothing such as blue jeans and turtlenecks.    Patients on Anticoagulation Medications:   IF YOU HAVE NOT RECEIVED INSTRUCTIONS REGARDING YOUR BLOOD THINNING MEDICATIONS WITHIN 7 DAYS OF YOUR SURGERY, PLEASE CALL THE GI DOCTOR'S OFFICE. FAILURE TO DO SO MAY RESULT IN CANCELLATION OF YOUR SURGERY.     Ask questions and insist on answers. Surgery can be a stressful time for anyone. If you have any questions or are unsure about any information given to you, be sure to ask for clarification. A patient can never ask too many questions. If there is something on your mind, let us know. We always want you to feel safe and confident in the care you are receiving.     TO ALL [facility] AMBULATORY SURGERY PATIENTS    You can decide today about the care you will receive in the future. [facility] respects your rights and will support your decisions to the fullest extent permitted by law, including your right to refuse or accept medical or surgical treatment. Please be advised that the [facility] prohibits ambulatory surgery centers from upholding Advanced Directives during surgical procedures. For this reason, any Advanced Directive will be null and void during your stay in the [facility].     Although not honored in the [facility], you are still entitled to be informed about your rights surrounding Advanced Directives. Advanced Directives are legal documents that enable you to specify what forms of treatment you want performed or withheld should you become unable to make or communicate these decisions on your own.  Although this is not a common decision made by outpatient surgery patients, we would like to let you know that an information booklet, \"A Personal Decision\" is available upon your request.      How do you know if an Advanced Directive is right for you?  It is important to realize your  rights as an individual, what the nature of consent for treatment implies, what a durable power of  for health care is and what a living will is.      After reviewing the booklet, \"A Personal Decision,\" should you have any further questions, please call us at [phone].      Thank you.     PATIENT’S RIGHTS    To be treated with respect, consideration and dignity, free from all forms of abuse, harassment and discrimination.     To receive quality care and high professional standards in a safe environment.    To be provided with appropriate privacy.    To expect that all disclosures and records are treated confidentially and, except when required by law, to be given the opportunity to approve or refuse their release.    To be provided, to the degree known, complete information concerning their diagnosis, treatment and prognosis. When appropriate, the information is provided to a person designated by the patient to be a legally authorized person.    To review the records pertaining to his/her medical care and to have the information explained or interpreted as necessary except when restricted law.    To expect that we will communicate with you in a matter that you can understand.     To be given the names of all practitioners and health care personnel participating in his/her care.    To make decisions about the plan of care prior to and during the course of treatment.  To refuse a recommended treatment or plan of care to the extent permitted by law and to be informed of the medical consequences of this action.     To expect that your treatment preferences will be responded to as delineated in your Advanced Directive, within the limits of the ASC bylaws regarding resuscitation    To be informed as to:  Rights and Responsibilities.  Services available in the organization.  Provisions for after-hours and emergency care.   The charges for services and available payment options.   The right to consent or decline  participation in proposed research  studies.   The available resources for resolving disputes, grievances, and conflicts.      To expect emergency procedures to be implemented without unnecessary delay.    To expect a safe hospital transfer with appropriate medical records when necessary.     To know that all patients rights extend to the patient’s legal representatives.     Complaints regarding Patients Rights or any issue surrounding care received during your stay can be made by contacting any of the following organizations:  Medicare patient: Visit the Office of Medicare Beneficiary Claudio at www.medicare.gov on the web.  Or call 1-800-Medicare (1-772.862.1523).     TTY users should call 1-265.118.3458.  Non-Medicare patients can contact the [facility] at [phone]; fax [phone], TTY [phone].  Any patient can also contact the [employer] at [phone] (toll free 8:30 am to 5:00 pm, central time, weekdays).        Patient Responsibilities    It is your responsibility as a [facility] patient:    To provide all personal and family health information needed to provide you with appropriate care.    To participate to the best of your ability in making decisions about your medical treatment, and to comply with the agreed upon plan of care.    To ask questions of your physician or other care providers when you do not understand any information or instructions.    To maintain appointments as scheduled, or to reschedule in a timely fashion.    To inform your physician and other care providers if you anticipate problems in following prescribed treatment.    To recognize the impact of your lifestyle on your personal health.    To inform your physician or other care provider if you desire a transfer of care to another physician.    To be considerate of others receiving and providing care.  To observe relevant surgery center policies and procedures.    To  accept financial responsibility for health care services and to work cooperatively with [facility] to resolve financial obligations    Please contact [facility] regarding billing, to verify your coverage and any out-of-pocket responsibility at [phone]             No
